# Patient Record
Sex: MALE | Race: BLACK OR AFRICAN AMERICAN | NOT HISPANIC OR LATINO | Employment: OTHER | URBAN - METROPOLITAN AREA
[De-identification: names, ages, dates, MRNs, and addresses within clinical notes are randomized per-mention and may not be internally consistent; named-entity substitution may affect disease eponyms.]

---

## 2017-12-19 ENCOUNTER — HOSPITAL ENCOUNTER (EMERGENCY)
Facility: HOSPITAL | Age: 77
Discharge: HOME | End: 2017-12-19
Attending: EMERGENCY MEDICINE
Payer: MEDICARE

## 2017-12-19 VITALS
HEART RATE: 89 BPM | HEIGHT: 73 IN | WEIGHT: 196.63 LBS | BODY MASS INDEX: 26.06 KG/M2 | DIASTOLIC BLOOD PRESSURE: 65 MMHG | RESPIRATION RATE: 18 BRPM | OXYGEN SATURATION: 96 % | TEMPERATURE: 100 F | SYSTOLIC BLOOD PRESSURE: 120 MMHG

## 2017-12-19 DIAGNOSIS — Z20.828 EXPOSURE TO INFLUENZA: Primary | ICD-10-CM

## 2017-12-19 DIAGNOSIS — J11.1 INFLUENZA-LIKE ILLNESS: ICD-10-CM

## 2017-12-19 PROCEDURE — 99283 EMERGENCY DEPT VISIT LOW MDM: CPT

## 2017-12-19 RX ORDER — METFORMIN HYDROCHLORIDE 500 MG/1
500 TABLET ORAL 2 TIMES DAILY WITH MEALS
COMMUNITY

## 2017-12-19 RX ORDER — ATORVASTATIN CALCIUM 40 MG/1
40 TABLET, FILM COATED ORAL DAILY
COMMUNITY

## 2017-12-19 RX ORDER — OSELTAMIVIR PHOSPHATE 75 MG/1
75 CAPSULE ORAL 2 TIMES DAILY
Qty: 10 CAPSULE | Refills: 0 | Status: SHIPPED | OUTPATIENT
Start: 2017-12-19 | End: 2017-12-24

## 2017-12-19 RX ORDER — GUAIFENESIN/DEXTROMETHORPHAN 100-10MG/5
5 SYRUP ORAL 4 TIMES DAILY PRN
Qty: 120 ML | Refills: 0 | Status: ON HOLD | OUTPATIENT
Start: 2017-12-19 | End: 2018-01-02 | Stop reason: HOSPADM

## 2017-12-19 RX ORDER — LISINOPRIL 10 MG/1
10 TABLET ORAL DAILY
COMMUNITY

## 2017-12-20 NOTE — ED PROVIDER NOTES
Encounter Date: 12/19/2017       History     Chief Complaint   Patient presents with    URI     Patient brought in by daughter who was seen here today with her children and another relative. He reports a cough for 1 week. He denies any mucus. Patient had the flu shot last week     The history is provided by the patient.   URI   The primary symptoms include fever, sore throat, cough and myalgias. The current episode started today. This is a new problem. The fever began just prior to arrival. The fever has been unchanged since its onset. The temperature was taken by no thermometer. The maximum temperature recorded prior to his arrival was unknown.   The sore throat began today. The sore throat has been unchanged since its onset. The sore throat pain is at a severity of 3/10.   The cough began today. The cough is non-productive. There is nondescript sputum produced.   Myalgias began today. The myalgias have been unchanged since their onset. The myalgias are generalized.   Pt with Influenza contact at home.    Review of patient's allergies indicates:  No Known Allergies  Past Medical History:   Diagnosis Date    Diabetes mellitus     Hyperlipemia     Prostate atrophy      History reviewed. No pertinent surgical history.  History reviewed. No pertinent family history.  Social History   Substance Use Topics    Smoking status: Former Smoker     Quit date: 1985    Smokeless tobacco: Never Used    Alcohol use No     Review of Systems   Constitutional: Positive for fever.   HENT: Positive for sore throat.    Respiratory: Positive for cough.    Musculoskeletal: Positive for myalgias.   All other systems reviewed and are negative.      Physical Exam     Initial Vitals [12/19/17 2113]   BP Pulse Resp Temp SpO2   120/65 89 18 99.8 °F (37.7 °C) 96 %      MAP       83.33         Physical Exam    Nursing note and vitals reviewed.  Constitutional: He appears well-developed and well-nourished. No distress.   HENT:   Head:  Normocephalic and atraumatic.   Mouth/Throat: Oropharynx is clear and moist.   Eyes: Pupils are equal, round, and reactive to light.   Neck: Normal range of motion. Neck supple.   Cardiovascular: Normal rate, regular rhythm and normal heart sounds.   Pulmonary/Chest: Breath sounds normal. No respiratory distress.   Abdominal: Soft. Bowel sounds are normal. There is no tenderness.   Musculoskeletal: Normal range of motion.   Neurological: He is alert and oriented to person, place, and time. He has normal strength.   Skin: Skin is warm and dry.   Psychiatric: He has a normal mood and affect. Thought content normal.         ED Course   Procedures  Labs Reviewed - No data to display     9:40 PM - Counseling: Spoke with the patient and discussed todays findings, in addition to providing specific details for the plan of care and counseling regarding the diagnosis and prognosis. Questions are answered at this time.                         ED Course      Clinical Impression:   The primary encounter diagnosis was Exposure to influenza. A diagnosis of Influenza-like illness was also pertinent to this visit.    Disposition:   Disposition: Discharged  Condition: Stable                        Eddie Aparicio MD  12/19/17 5115

## 2017-12-20 NOTE — ED NOTES
Pt's prescriptions called in to [facility] on [address]--[place], [place] per pt's request.

## 2017-12-29 ENCOUNTER — HOSPITAL ENCOUNTER (INPATIENT)
Facility: HOSPITAL | Age: 77
LOS: 3 days | Discharge: HOME HEALTH | DRG: 189 | End: 2018-01-02
Attending: EMERGENCY MEDICINE | Admitting: INTERNAL MEDICINE
Payer: MEDICARE

## 2017-12-29 DIAGNOSIS — I26.99 OTHER ACUTE PULMONARY EMBOLISM WITHOUT ACUTE COR PULMONALE: ICD-10-CM

## 2017-12-29 DIAGNOSIS — I26.99 PULMONARY EMBOLISM: ICD-10-CM

## 2017-12-29 DIAGNOSIS — D64.9 ANEMIA, UNSPECIFIED TYPE: ICD-10-CM

## 2017-12-29 DIAGNOSIS — J18.9 PNEUMONIA DUE TO INFECTIOUS ORGANISM, UNSPECIFIED LATERALITY, UNSPECIFIED PART OF LUNG: Primary | ICD-10-CM

## 2017-12-29 DIAGNOSIS — E11.9 TYPE 2 DIABETES MELLITUS WITHOUT COMPLICATION, WITH LONG-TERM CURRENT USE OF INSULIN: ICD-10-CM

## 2017-12-29 DIAGNOSIS — Z79.4 TYPE 2 DIABETES MELLITUS WITHOUT COMPLICATION, WITH LONG-TERM CURRENT USE OF INSULIN: ICD-10-CM

## 2017-12-29 DIAGNOSIS — J18.9 PNEUMONIA OF RIGHT LOWER LOBE DUE TO INFECTIOUS ORGANISM: ICD-10-CM

## 2017-12-29 DIAGNOSIS — R09.02 HYPOXIA: ICD-10-CM

## 2017-12-29 LAB
ALBUMIN SERPL BCP-MCNC: 2.3 G/DL
ALLENS TEST: ABNORMAL
ALLENS TEST: ABNORMAL
ALP SERPL-CCNC: 93 U/L
ALT SERPL W/O P-5'-P-CCNC: 37 U/L
ANION GAP SERPL CALC-SCNC: 9 MMOL/L
ANISOCYTOSIS BLD QL SMEAR: SLIGHT
APTT BLDCRRT: 23.4 SEC
APTT BLDCRRT: 39 SEC
AST SERPL-CCNC: 27 U/L
BASOPHILS # BLD AUTO: 0.02 K/UL
BASOPHILS NFR BLD: 0.1 %
BILIRUB SERPL-MCNC: 0.9 MG/DL
BILIRUB UR QL STRIP: NEGATIVE
BNP SERPL-MCNC: <10 PG/ML
BUN SERPL-MCNC: 14 MG/DL
CALCIUM SERPL-MCNC: 8.7 MG/DL
CHLORIDE SERPL-SCNC: 96 MMOL/L
CLARITY UR REFRACT.AUTO: CLEAR
CO2 SERPL-SCNC: 28 MMOL/L
COLOR UR AUTO: YELLOW
CORTIS SERPL-MCNC: 25.8 UG/DL
CREAT SERPL-MCNC: 1 MG/DL
DELSYS: ABNORMAL
DELSYS: ABNORMAL
DIFFERENTIAL METHOD: ABNORMAL
EOSINOPHIL # BLD AUTO: 0 K/UL
EOSINOPHIL NFR BLD: 0.1 %
ERYTHROCYTE [DISTWIDTH] IN BLOOD BY AUTOMATED COUNT: 12.2 %
EST. GFR  (AFRICAN AMERICAN): >60 ML/MIN/1.73 M^2
EST. GFR  (NON AFRICAN AMERICAN): >60 ML/MIN/1.73 M^2
FIO2: 21
FIO2: 21
FLUAV AG SPEC QL IA: NEGATIVE
FLUBV AG SPEC QL IA: NEGATIVE
GLUCOSE SERPL-MCNC: 163 MG/DL
GLUCOSE UR QL STRIP: ABNORMAL
HCO3 UR-SCNC: 25.3 MMOL/L (ref 24–28)
HCO3 UR-SCNC: 25.9 MMOL/L (ref 24–28)
HCT VFR BLD AUTO: 26.5 %
HCT VFR BLD AUTO: 28.5 %
HGB BLD-MCNC: 8.2 G/DL
HGB BLD-MCNC: 9.1 G/DL
HGB UR QL STRIP: NEGATIVE
INR PPP: 1.1
IRON SERPL-MCNC: 10 UG/DL
KETONES UR QL STRIP: NEGATIVE
LACTATE SERPL-SCNC: 1.3 MMOL/L
LACTATE SERPL-SCNC: 1.5 MMOL/L
LEUKOCYTE ESTERASE UR QL STRIP: NEGATIVE
LIPASE SERPL-CCNC: 31 U/L
LYMPHOCYTES # BLD AUTO: 1.2 K/UL
LYMPHOCYTES NFR BLD: 6.3 %
MAGNESIUM SERPL-MCNC: 1.7 MG/DL
MCH RBC QN AUTO: 27.3 PG
MCHC RBC AUTO-ENTMCNC: 31.9 G/DL
MCV RBC AUTO: 86 FL
MODE: ABNORMAL
MODE: ABNORMAL
MONOCYTES # BLD AUTO: 1.8 K/UL
MONOCYTES NFR BLD: 9.4 %
NEUTROPHILS # BLD AUTO: 16 K/UL
NEUTROPHILS NFR BLD: 82.3 %
NITRITE UR QL STRIP: NEGATIVE
OVALOCYTES BLD QL SMEAR: ABNORMAL
PCO2 BLDA: 34.3 MMHG (ref 35–45)
PCO2 BLDA: 35.7 MMHG (ref 35–45)
PH SMN: 7.46 [PH] (ref 7.35–7.45)
PH SMN: 7.49 [PH] (ref 7.35–7.45)
PH UR STRIP: 5 [PH] (ref 5–8)
PHOSPHATE SERPL-MCNC: 3.5 MG/DL
PLATELET # BLD AUTO: 525 K/UL
PLATELET BLD QL SMEAR: ABNORMAL
PMV BLD AUTO: 9.5 FL
PO2 BLDA: 46 MMHG (ref 80–100)
PO2 BLDA: 46 MMHG (ref 80–100)
POC BE: 1 MMOL/L
POC BE: 2 MMOL/L
POC SATURATED O2: 84 % (ref 95–100)
POC SATURATED O2: 85 % (ref 95–100)
POIKILOCYTOSIS BLD QL SMEAR: SLIGHT
POLYCHROMASIA BLD QL SMEAR: ABNORMAL
POTASSIUM SERPL-SCNC: 4.1 MMOL/L
PROCALCITONIN SERPL IA-MCNC: 0.79 NG/ML
PROT SERPL-MCNC: 7 G/DL
PROT UR QL STRIP: NEGATIVE
PROTHROMBIN TIME: 11.5 SEC
RBC # BLD AUTO: 3.33 M/UL
RETICS/RBC NFR AUTO: 2.8 %
SAMPLE: ABNORMAL
SAMPLE: ABNORMAL
SATURATED IRON: 5 %
SITE: ABNORMAL
SITE: ABNORMAL
SODIUM SERPL-SCNC: 133 MMOL/L
SP GR UR STRIP: 1.01 (ref 1–1.03)
SPECIMEN SOURCE: NORMAL
TOTAL IRON BINDING CAPACITY: 188 UG/DL
TRANSFERRIN SERPL-MCNC: 127 MG/DL
TROPONIN I SERPL DL<=0.01 NG/ML-MCNC: <0.006 NG/ML
TSH SERPL DL<=0.005 MIU/L-ACNC: 1.33 UIU/ML
URN SPEC COLLECT METH UR: ABNORMAL
UROBILINOGEN UR STRIP-ACNC: ABNORMAL EU/DL
WBC # BLD AUTO: 19.39 K/UL

## 2017-12-29 PROCEDURE — 96366 THER/PROPH/DIAG IV INF ADDON: CPT | Mod: XS

## 2017-12-29 PROCEDURE — 94640 AIRWAY INHALATION TREATMENT: CPT

## 2017-12-29 PROCEDURE — 84145 PROCALCITONIN (PCT): CPT

## 2017-12-29 PROCEDURE — 85730 THROMBOPLASTIN TIME PARTIAL: CPT | Mod: 91

## 2017-12-29 PROCEDURE — 83735 ASSAY OF MAGNESIUM: CPT

## 2017-12-29 PROCEDURE — 82533 TOTAL CORTISOL: CPT

## 2017-12-29 PROCEDURE — 83690 ASSAY OF LIPASE: CPT

## 2017-12-29 PROCEDURE — 82746 ASSAY OF FOLIC ACID SERUM: CPT

## 2017-12-29 PROCEDURE — 96365 THER/PROPH/DIAG IV INF INIT: CPT | Mod: XS

## 2017-12-29 PROCEDURE — 99900035 HC TECH TIME PER 15 MIN (STAT)

## 2017-12-29 PROCEDURE — 87086 URINE CULTURE/COLONY COUNT: CPT

## 2017-12-29 PROCEDURE — 85045 AUTOMATED RETICULOCYTE COUNT: CPT

## 2017-12-29 PROCEDURE — 85018 HEMOGLOBIN: CPT

## 2017-12-29 PROCEDURE — 96375 TX/PRO/DX INJ NEW DRUG ADDON: CPT | Mod: XS

## 2017-12-29 PROCEDURE — 82803 BLOOD GASES ANY COMBINATION: CPT

## 2017-12-29 PROCEDURE — 96361 HYDRATE IV INFUSION ADD-ON: CPT

## 2017-12-29 PROCEDURE — 25000003 PHARM REV CODE 250: Performed by: EMERGENCY MEDICINE

## 2017-12-29 PROCEDURE — 81003 URINALYSIS AUTO W/O SCOPE: CPT

## 2017-12-29 PROCEDURE — 87400 INFLUENZA A/B EACH AG IA: CPT

## 2017-12-29 PROCEDURE — 84100 ASSAY OF PHOSPHORUS: CPT

## 2017-12-29 PROCEDURE — 87040 BLOOD CULTURE FOR BACTERIA: CPT | Mod: 59

## 2017-12-29 PROCEDURE — 21400001 HC TELEMETRY ROOM

## 2017-12-29 PROCEDURE — 36600 WITHDRAWAL OF ARTERIAL BLOOD: CPT

## 2017-12-29 PROCEDURE — 85014 HEMATOCRIT: CPT | Mod: 91

## 2017-12-29 PROCEDURE — 27000221 HC OXYGEN, UP TO 24 HOURS

## 2017-12-29 PROCEDURE — 93005 ELECTROCARDIOGRAM TRACING: CPT

## 2017-12-29 PROCEDURE — 83540 ASSAY OF IRON: CPT

## 2017-12-29 PROCEDURE — 96367 TX/PROPH/DG ADDL SEQ IV INF: CPT

## 2017-12-29 PROCEDURE — 63600175 PHARM REV CODE 636 W HCPCS: Performed by: EMERGENCY MEDICINE

## 2017-12-29 PROCEDURE — 84443 ASSAY THYROID STIM HORMONE: CPT

## 2017-12-29 PROCEDURE — 25000242 PHARM REV CODE 250 ALT 637 W/ HCPCS: Performed by: EMERGENCY MEDICINE

## 2017-12-29 PROCEDURE — 96376 TX/PRO/DX INJ SAME DRUG ADON: CPT | Mod: XS

## 2017-12-29 PROCEDURE — 80053 COMPREHEN METABOLIC PANEL: CPT

## 2017-12-29 PROCEDURE — 96368 THER/DIAG CONCURRENT INF: CPT | Mod: XS

## 2017-12-29 PROCEDURE — 85025 COMPLETE CBC W/AUTO DIFF WBC: CPT

## 2017-12-29 PROCEDURE — 93010 ELECTROCARDIOGRAM REPORT: CPT | Mod: ,,, | Performed by: INTERNAL MEDICINE

## 2017-12-29 PROCEDURE — 85730 THROMBOPLASTIN TIME PARTIAL: CPT

## 2017-12-29 PROCEDURE — 25500020 PHARM REV CODE 255: Performed by: EMERGENCY MEDICINE

## 2017-12-29 PROCEDURE — 82607 VITAMIN B-12: CPT

## 2017-12-29 PROCEDURE — 83605 ASSAY OF LACTIC ACID: CPT | Mod: 91

## 2017-12-29 PROCEDURE — 84484 ASSAY OF TROPONIN QUANT: CPT

## 2017-12-29 PROCEDURE — 85610 PROTHROMBIN TIME: CPT

## 2017-12-29 PROCEDURE — 83880 ASSAY OF NATRIURETIC PEPTIDE: CPT

## 2017-12-29 PROCEDURE — 99291 CRITICAL CARE FIRST HOUR: CPT | Mod: 25

## 2017-12-29 RX ORDER — IPRATROPIUM BROMIDE AND ALBUTEROL SULFATE 2.5; .5 MG/3ML; MG/3ML
3 SOLUTION RESPIRATORY (INHALATION)
Status: DISPENSED | OUTPATIENT
Start: 2017-12-29 | End: 2017-12-29

## 2017-12-29 RX ORDER — LATANOPROST 50 UG/ML
1 SOLUTION/ DROPS OPHTHALMIC NIGHTLY
COMMUNITY

## 2017-12-29 RX ORDER — METHYLPREDNISOLONE SOD SUCC 125 MG
80 VIAL (EA) INJECTION EVERY 8 HOURS
Status: DISCONTINUED | OUTPATIENT
Start: 2017-12-29 | End: 2018-01-02 | Stop reason: HOSPADM

## 2017-12-29 RX ORDER — DORZOLAMIDE HYDROCHLORIDE AND TIMOLOL MALEATE 20; 5 MG/ML; MG/ML
1 SOLUTION/ DROPS OPHTHALMIC 2 TIMES DAILY
COMMUNITY

## 2017-12-29 RX ORDER — HEPARIN SODIUM,PORCINE/D5W 25000/250
17 INTRAVENOUS SOLUTION INTRAVENOUS CONTINUOUS
Status: DISCONTINUED | OUTPATIENT
Start: 2017-12-29 | End: 2017-12-30

## 2017-12-29 RX ORDER — ALBUTEROL SULFATE 90 UG/1
2 AEROSOL, METERED RESPIRATORY (INHALATION) EVERY 6 HOURS PRN
COMMUNITY

## 2017-12-29 RX ORDER — SODIUM CHLORIDE 9 MG/ML
INJECTION, SOLUTION INTRAVENOUS CONTINUOUS
Status: DISCONTINUED | OUTPATIENT
Start: 2017-12-29 | End: 2018-01-02 | Stop reason: HOSPADM

## 2017-12-29 RX ORDER — CIPROFLOXACIN 2 MG/ML
400 INJECTION, SOLUTION INTRAVENOUS
Status: DISCONTINUED | OUTPATIENT
Start: 2017-12-29 | End: 2018-01-02 | Stop reason: HOSPADM

## 2017-12-29 RX ORDER — IPRATROPIUM BROMIDE AND ALBUTEROL SULFATE 2.5; .5 MG/3ML; MG/3ML
3 SOLUTION RESPIRATORY (INHALATION) EVERY 6 HOURS
Status: DISCONTINUED | OUTPATIENT
Start: 2017-12-29 | End: 2018-01-02 | Stop reason: HOSPADM

## 2017-12-29 RX ORDER — INSULIN GLARGINE 100 [IU]/ML
25 INJECTION, SOLUTION SUBCUTANEOUS DAILY
COMMUNITY

## 2017-12-29 RX ADMIN — VANCOMYCIN HYDROCHLORIDE 1 G: 1 INJECTION, POWDER, LYOPHILIZED, FOR SOLUTION INTRAVENOUS at 03:12

## 2017-12-29 RX ADMIN — IPRATROPIUM BROMIDE AND ALBUTEROL SULFATE 3 ML: .5; 3 SOLUTION RESPIRATORY (INHALATION) at 01:12

## 2017-12-29 RX ADMIN — CIPROFLOXACIN 400 MG: 2 INJECTION, SOLUTION INTRAVENOUS at 02:12

## 2017-12-29 RX ADMIN — IPRATROPIUM BROMIDE AND ALBUTEROL SULFATE 3 ML: .5; 3 SOLUTION RESPIRATORY (INHALATION) at 07:12

## 2017-12-29 RX ADMIN — PIPERACILLIN AND TAZOBACTAM 4.5 G: 4; .5 INJECTION, POWDER, FOR SOLUTION INTRAVENOUS at 11:12

## 2017-12-29 RX ADMIN — IOHEXOL 100 ML: 350 INJECTION, SOLUTION INTRAVENOUS at 02:12

## 2017-12-29 RX ADMIN — HEPARIN SODIUM AND DEXTROSE 17 UNITS/KG/HR: 10000; 5 INJECTION INTRAVENOUS at 03:12

## 2017-12-29 RX ADMIN — PIPERACILLIN AND TAZOBACTAM 4.5 G: 4; .5 INJECTION, POWDER, FOR SOLUTION INTRAVENOUS at 01:12

## 2017-12-29 RX ADMIN — SODIUM CHLORIDE 2544 ML: 0.9 INJECTION, SOLUTION INTRAVENOUS at 01:12

## 2017-12-29 RX ADMIN — SODIUM CHLORIDE: 0.9 INJECTION, SOLUTION INTRAVENOUS at 04:12

## 2017-12-29 RX ADMIN — VANCOMYCIN HYDROCHLORIDE 500 MG: 500 INJECTION, POWDER, LYOPHILIZED, FOR SOLUTION INTRAVENOUS at 04:12

## 2017-12-29 RX ADMIN — METHYLPREDNISOLONE SODIUM SUCCINATE 80 MG: 125 INJECTION, POWDER, FOR SOLUTION INTRAMUSCULAR; INTRAVENOUS at 11:12

## 2017-12-29 NOTE — PROGRESS NOTES
ABG done at 1312. PO2 46. MD asked RT to repeat sample. ABG obtained from opposite arm. PO2 was the same, 46. MD informed. Pt placed on NC 4lpm and neb txs given.

## 2017-12-29 NOTE — ED PROVIDER NOTES
Encounter Date: 12/29/2017       History     Chief Complaint   Patient presents with    Shortness of Breath     HH nurse sent patient to the ED for low O2 sat; pt has history of flu; [facility]     CHIEF COMPLIANT: Shortness of Breath (HH nurse sent patient to the ED for low O2 sat; pt has history of flu; [facility] ([facility]      12/29/2017, 12:52 PM     The history is provided by the patient and edmonder. Uday Reyna is a 77 y.o. male presenting to the ED for low oxygen noted by home health care.  Patient reportedly was exposed to the flu on 19 December.  He presented to [facility] on December 20 and was admitted for influenza type B.  He completed his course of Tamiflu.  Patient was admitted from 20 December until 26 December.  He was discharged home with continued steroid taper 40 mg for 2 days then 20 mg for 2 days.  Daughter notes that he's had a nonproductive cough.  He was not discharged home on antibiotics.  He's been treated with spirometry and Mucinex.  Patient reports that he feels short of breath.  Daughter notes that he is breathing heavy when he exerts himself.  Appears to be worse with exertion, better with rest.  There is no associated paroxysmal nocturnal dyspnea or orthopnea.  Patient and family report intermittent fever while he was hospitalized.  Patient denies any prior history of pulmonary embolism, DVT, calf pain, calf tenderness, or trauma to lower extremities.  Of note, patient was hospitalized for 6 days.  Patient denies any chest pain, chest pressure, hemoptysis, melena, hematochezia, hematemesis, dysuria, urgency, frequency.    PCP: Wallace Kennedy MD  Specialist:             Review of patient's allergies indicates:  No Known Allergies  Past Medical History:   Diagnosis Date    COPD (chronic obstructive pulmonary disease)     Diabetes mellitus     Hyperlipemia     Prostate atrophy      Past Surgical History:  "  Procedure Laterality Date    CARPAL TUNNEL RELEASE      WRIST FRACTURE SURGERY       History reviewed. No pertinent family history.  Social History   Substance Use Topics    Smoking status: Former Smoker     Quit date: 1985    Smokeless tobacco: Never Used    Alcohol use No     Review of Systems   Constitutional: Positive for chills, fatigue and fever.   HENT: Negative for sore throat.    Respiratory: Positive for cough and shortness of breath.    Cardiovascular: Negative for chest pain and leg swelling.   Gastrointestinal: Negative for anal bleeding, blood in stool, constipation, nausea and vomiting.   Genitourinary: Negative for dysuria.   Musculoskeletal: Negative for back pain.   Skin: Negative for rash.   Neurological: Negative for weakness and light-headedness.   Hematological: Does not bruise/bleed easily.   Psychiatric/Behavioral: Negative for confusion.       Physical Exam     Initial Vitals [12/29/17 1242]   BP Pulse Resp Temp SpO2   (!) 115/55 94 (!) 22 99.5 °F (37.5 °C) (!) 89 %      MAP       75         Vitals:    12/29/17 1242 12/29/17 1314 12/29/17 1315 12/29/17 1330   BP: (!) 115/55  (!) 115/57 118/66   Pulse: 94 90 89 89   Resp: (!) 22  (!) 26 (!) 25   Temp: 99.5 °F (37.5 °C)      TempSrc: Oral      SpO2: (!) 89%  95% 95%   Weight: 84.8 kg (187 lb)      Height: 6' 1" (1.854 m)       12/29/17 1334 12/29/17 1339 12/29/17 1345 12/29/17 1430   BP:   132/64 (!) 109/56   Pulse: 86 85 85 94   Resp: (!) 24 (!) 25 (!) 26 (!) 24   Temp:       TempSrc:       SpO2: 100% 100% 100% 99%   Weight:       Height:        12/29/17 1515 12/29/17 1530 12/29/17 1600 12/29/17 1630   BP: (!) 112/57 (!) 106/55 (!) 116/54 (!) 118/57   Pulse: 91 88 94 91   Resp: (!) 23 (!) 21 (!) 23 (!) 22   Temp: 99 °F (37.2 °C)      TempSrc: Oral      SpO2: 98% 99% 100% 100%   Weight:       Height:        12/29/17 1700 12/29/17 1730   BP: 122/60 (!) 117/56   Pulse: 91 96   Resp: (!) 24 (!) 29   Temp:     TempSrc:     SpO2: 99% 98% "   Weight:     Height:         Physical Exam    Nursing note and vitals reviewed.  Constitutional: He appears well-developed and well-nourished.   HENT:   Head: Normocephalic.   Right Ear: External ear normal.   Left Ear: External ear normal.   Nose: Mucosal edema and rhinorrhea present. No epistaxis.   Mouth/Throat: Oropharynx is clear and moist.   Eyes: Pupils are equal, round, and reactive to light.   Neck: Normal range of motion.   Cardiovascular: Normal rate, regular rhythm, normal heart sounds and intact distal pulses.   Pulmonary/Chest: He has no wheezes. He has rhonchi. He has no rales.   Abdominal: Soft. Bowel sounds are normal. There is no tenderness. There is no rebound and no guarding.   Musculoskeletal: Normal range of motion. He exhibits no edema or tenderness.   Neurological: He is alert and oriented to person, place, and time. He has normal strength. No cranial nerve deficit.   Skin: Skin is warm.   Psychiatric: He has a normal mood and affect. Judgment and thought content normal.         ED Course   Critical Care  Date/Time: 12/29/2017 12:52 PM  Performed by: KWAKU HALEY  Authorized by: KWAKU HALEY   Direct patient critical care time: 10 minutes  Additional history critical care time: 10 minutes  Ordering / reviewing critical care time: 8 minutes  Documentation critical care time: 10 minutes  Consulting other physicians critical care time: 7 minutes  Other critical care time: 10 minutes  Total critical care time (exclusive of procedural time) : 55 minutes  Critical care time was exclusive of separately billable procedures and treating other patients.  Critical care was necessary to treat or prevent imminent or life-threatening deterioration of the following conditions: sepsis and circulatory failure.  Critical care was time spent personally by me on the following activities: blood draw for specimens, development of treatment plan with patient or surrogate, discussions with consultants,  evaluation of patient's response to treatment, examination of patient, obtaining history from patient or surrogate, ordering and performing treatments and interventions, ordering and review of laboratory studies, ordering and review of radiographic studies, pulse oximetry, re-evaluation of patient's condition and review of old charts.  Subsequent provider of critical care: I assumed direction of critical care for this patient from another provider of my specialty.        Labs Reviewed   CBC W/ AUTO DIFFERENTIAL - Abnormal; Notable for the following:        Result Value    WBC 19.39 (*)     RBC 3.33 (*)     Hemoglobin 9.1 (*)     Hematocrit 28.5 (*)     MCHC 31.9 (*)     Platelets 525 (*)     Gran # 16.0 (*)     Mono # 1.8 (*)     Gran% 82.3 (*)     Lymph% 6.3 (*)     Platelet Estimate Increased (*)     All other components within normal limits   COMPREHENSIVE METABOLIC PANEL - Abnormal; Notable for the following:     Sodium 133 (*)     Glucose 163 (*)     Albumin 2.3 (*)     All other components within normal limits   URINALYSIS - Abnormal; Notable for the following:     Glucose, UA Trace (*)     Urobilinogen, UA 2.0-3.0 (*)     All other components within normal limits   RETICULOCYTES - Abnormal; Notable for the following:     Retic 2.8 (*)     All other components within normal limits   ISTAT PROCEDURE - Abnormal; Notable for the following:     POC PH 7.459 (*)     POC PO2 46 (*)     POC SATURATED O2 84 (*)     All other components within normal limits   ISTAT PROCEDURE - Abnormal; Notable for the following:     POC PH 7.486 (*)     POC PCO2 34.3 (*)     POC PO2 46 (*)     POC SATURATED O2 85 (*)     All other components within normal limits   CULTURE, BLOOD   CULTURE, BLOOD   CULTURE, URINE   APTT   B-TYPE NATRIURETIC PEPTIDE   LACTIC ACID, PLASMA   LIPASE   MAGNESIUM   PHOSPHORUS   PROTIME-INR   TROPONIN I   TSH   INFLUENZA A AND B ANTIGEN   VITAMIN B12   IRON AND TIBC   FOLATE   RETICULOCYTES   CORTISOL, RANDOM    PROCALCITONIN   APTT   PROTIME-INR   PLATELET COUNT   IRON AND TIBC   LACTIC ACID, PLASMA   LACTIC ACID, PLASMA   HEMOGLOBIN   HEMATOCRIT   VITAMIN B12   FOLATE   TYPE & SCREEN     EKG Readings: (Independently Interpreted)   Rate 90 bpm.  Sinus rhythm.  Normal axis.  No acute ST or T-wave changes noted.     Results for orders placed or performed during the hospital encounter of 12/29/17   APTT   Result Value Ref Range    aPTT 23.4 21.0 - 32.0 sec   Brain natriuretic peptide   Result Value Ref Range    BNP <10 0 - 99 pg/mL   CBC auto differential   Result Value Ref Range    WBC 19.39 (H) 3.90 - 12.70 K/uL    RBC 3.33 (L) 4.60 - 6.20 M/uL    Hemoglobin 9.1 (L) 14.0 - 18.0 g/dL    Hematocrit 28.5 (L) 40.0 - 54.0 %    MCV 86 82 - 98 fL    MCH 27.3 27.0 - 31.0 pg    MCHC 31.9 (L) 32.0 - 36.0 g/dL    RDW 12.2 11.5 - 14.5 %    Platelets 525 (H) 150 - 350 K/uL    MPV 9.5 9.2 - 12.9 fL    Gran # 16.0 (H) 1.8 - 7.7 K/uL    Lymph # 1.2 1.0 - 4.8 K/uL    Mono # 1.8 (H) 0.3 - 1.0 K/uL    Eos # 0.0 0.0 - 0.5 K/uL    Baso # 0.02 0.00 - 0.20 K/uL    Gran% 82.3 (H) 38.0 - 73.0 %    Lymph% 6.3 (L) 18.0 - 48.0 %    Mono% 9.4 4.0 - 15.0 %    Eosinophil% 0.1 0.0 - 8.0 %    Basophil% 0.1 0.0 - 1.9 %    Platelet Estimate Increased (A)     Aniso Slight     Poik Slight     Poly Occasional     Ovalocytes CANCELED     Differential Method Automated    Comprehensive metabolic panel   Result Value Ref Range    Sodium 133 (L) 136 - 145 mmol/L    Potassium 4.1 3.5 - 5.1 mmol/L    Chloride 96 95 - 110 mmol/L    CO2 28 23 - 29 mmol/L    Glucose 163 (H) 70 - 110 mg/dL    BUN, Bld 14 8 - 23 mg/dL    Creatinine 1.0 0.5 - 1.4 mg/dL    Calcium 8.7 8.7 - 10.5 mg/dL    Total Protein 7.0 6.0 - 8.4 g/dL    Albumin 2.3 (L) 3.5 - 5.2 g/dL    Total Bilirubin 0.9 0.1 - 1.0 mg/dL    Alkaline Phosphatase 93 55 - 135 U/L    AST 27 10 - 40 U/L    ALT 37 10 - 44 U/L    Anion Gap 9 8 - 16 mmol/L    eGFR if African American >60.0 >60 mL/min/1.73 m^2    eGFR if non  African American >60.0 >60 mL/min/1.73 m^2   Lactic acid, plasma #1   Result Value Ref Range    Lactate (Lactic Acid) 1.5 0.5 - 2.2 mmol/L   Lipase   Result Value Ref Range    Lipase 31 4 - 60 U/L   Magnesium   Result Value Ref Range    Magnesium 1.7 1.6 - 2.6 mg/dL   Phosphorus   Result Value Ref Range    Phosphorus 3.5 2.7 - 4.5 mg/dL   Protime-INR   Result Value Ref Range    Prothrombin Time 11.5 9.0 - 12.5 sec    INR 1.1 0.8 - 1.2   Troponin I   Result Value Ref Range    Troponin I <0.006 0.000 - 0.026 ng/mL   TSH   Result Value Ref Range    TSH 1.331 0.400 - 4.000 uIU/mL   Urinalysis   Result Value Ref Range    Specimen UA Urine, Clean Catch     Color, UA Yellow Yellow, Straw, Adriana    Appearance, UA Clear Clear    pH, UA 5.0 5.0 - 8.0    Specific Gravity, UA 1.015 1.005 - 1.030    Protein, UA Negative Negative    Glucose, UA Trace (A) Negative    Ketones, UA Negative Negative    Bilirubin (UA) Negative Negative    Occult Blood UA Negative Negative    Nitrite, UA Negative Negative    Urobilinogen, UA 2.0-3.0 (A) <2.0 EU/dL    Leukocytes, UA Negative Negative   Influenza antigen Nasal Swab   Result Value Ref Range    Influenza A Ag, EIA Negative Negative    Influenza B Ag, EIA Negative Negative    Flu A & B Source Nasal Swab    Reticulocytes   Result Value Ref Range    Retic 2.8 (H) 0.4 - 2.0 %   ISTAT PROCEDURE   Result Value Ref Range    POC PH 7.459 (H) 7.35 - 7.45    POC PCO2 35.7 35 - 45 mmHg    POC PO2 46 (LL) 80 - 100 mmHg    POC HCO3 25.3 24 - 28 mmol/L    POC BE 1 -2 to 2 mmol/L    POC SATURATED O2 84 (L) 95 - 100 %    Sample ARTERIAL     Site LR     Allens Test Pass     DelSys Room Air     Mode SPONT     FiO2 21    ISTAT PROCEDURE   Result Value Ref Range    POC PH 7.486 (H) 7.35 - 7.45    POC PCO2 34.3 (L) 35 - 45 mmHg    POC PO2 46 (LL) 80 - 100 mmHg    POC HCO3 25.9 24 - 28 mmol/L    POC BE 2 -2 to 2 mmol/L    POC SATURATED O2 85 (L) 95 - 100 %    Sample ARTERIAL     Site RR     Allens Test Pass      DelSys Room Air     Mode SPONT     FiO2 21        Imaging Results          US Lower Extremity Veins Bilateral (Final result)  Result time 12/29/17 17:17:20    Final result by Jt Warren MD (12/29/17 17:17:20)                 Impression:     There is no evidence of deep venous thrombosis bilateral lower extremities.        Electronically signed by: JT WARREN  Date:     12/29/17  Time:    17:17              Narrative:    Exam: US LOWER EXTREMITY VEINS BILATERAL     Indication:  I26.99 Other pulmonary embolism without acute cor pulmonale;    Findings: There is a documented compressibility and color Doppler flow with normal venous waveform in good calf augmentation response bilateral lower extremities.                             CTA Chest Non-Coronary (PE Study) (Final result)     Abnormal  Result time 12/29/17 14:35:39    Final result by Joey Schafer MD (12/29/17 14:35:39)                 Impression:         Patchy nodular interstitial and alveolar infiltrates are seen within the lower lobes and to a lesser degree within the right upper lobe centrally which are thought to reflect multifocal airspace disease or aspiration. Followup to resolution recommended.    Small pulmonary embolism suspected within a subsegmental branch of the right upper lobe on image 62, sequence 2. Small clots within the lower lobes bilaterally are not excluded however evaluation of segmental branches is limited due to motion and bolus tracking. The RV to LV ratio measures 1.1 which is equivocal for right heart strain. Consider cardiac echo as clinically warranted.    Moderate adenopathy within the mediastinum and bilateral hilar regions, right greater than left, which are likely reactive.     Findings discussed with KWAKU HALEY at 14:35:12    All CT scans at this facility use dose modulation, iterative reconstruction, and/or weight base dosing when appropriate to reduce radiation dose to as low as reasonably  achievable.      Electronically signed by: FREYA VENEGAS MD  Date:     12/29/17  Time:    14:35              Narrative:    Clinical data: Hypoxia.    Axial CTA images through the chest after administration of contrast was performed according to PE CT angiogram study protocol after administration of 100 cc of Omni 350 contrast.    Findings:    No pulmonary embolism is seen within the main pulmonary artery or right/left pulmonary arteries however clot suspected within a subsegmental branch of the right upper lobe on image 62, sequence 2. Evaluation of segmental branches is limited due to motion and bolus tracking.. The RV to LV ratio measures 1.1 which is equivocal for right heart strain. Consider cardiac echo as clinically warranted.    Structures of the base of the neck are normal.    The heart and pericardium are unremarkable.    Mediastinal structures including great vessels, trachea, esophagus are intact.    Moderate adenopathy within the mediastinum and bilateral hilar regions, right greater than left, which are likely reactive. No evidence of axillary adenopathy..      Patchy nodular interstitial and alveolar infiltrates are seen within the lower lobes and to a lesser degree within the right upper lobe centrally which are thought to reflect multifocal airspace disease or aspiration. Followup.    Surrounding chest wall structures, visualized abdominal organs, bones are unremarkable.                             X-Ray Chest AP Portable (Final result)  Result time 12/29/17 14:03:06    Final result by Angus Dodd MD (12/29/17 14:03:06)                 Impression:     See above            Electronically signed by: ANGUS DODD MD  Date:     12/29/17  Time:    14:03              Narrative:    Exam: Portable chest radiograph    Clinical History:   sepsis.     Comparison: None.    Findings:.     There is some retrocardiac left lower lobe atelectasis and or infiltrate and subtle interstitial opacity in the lung bases  bilaterally. Heart size appears borderline enlarged. No pleural effusion or pneumothorax.                            Results for NEGRO LANDAVERDE (MRN [id number]) as of 12/29/2017 14:33   Ref. Range 12/29/2017 13:12 12/29/2017 13:26   POC PH Latest Ref Range: 7.35 - 7.45  7.459 (H) 7.486 (H)   POC PCO2 Latest Ref Range: 35 - 45 mmHg 35.7 34.3 (L)   POC PO2 Latest Ref Range: 80 - 100 mmHg 46 (LL) 46 (LL)   POC BE Latest Ref Range: -2 to 2 mmol/L 1 2   POC HCO3 Latest Ref Range: 24 - 28 mmol/L 25.3 25.9   POC SATURATED O2 Latest Ref Range: 95 - 100 % 84 (L) 85 (L)   FiO2 Unknown 21 21   Sample Unknown ARTERIAL ARTERIAL   DelSys Unknown Room Air Room Air   Allens Test Unknown Pass Pass   Site Unknown LR RR   Mode Unknown SPONT SPONT        Medical Decision Making:   History:   Old Medical Records: I decided to obtain old medical records.  Old Records Summarized: records from another hospital.       <> Summary of Records: Formatting of this note may be different from the original.  Admit Date 12/20/2017   Discharge Date 12/26/2017   Location [place] [address]   Treatment Team Primary Care Physician: Wallace Kennedy MD  Discharging Physician: Trung Tapia MD  Treatment Team:   Attending Provider: Trung Tapia MD  Hospitalist: [facility] Md Day Team #02 [id number]  Hospitalist: [facility] Md Pm #01 [id number] ([facility] 1-3)  Hospitalist: [facility] Nurse Day Team #02 [id number]  Admitting Provider: Trung Tapia MD       Reason for Hospitalization   Cough, congestion, shortness of breath    Influenza B  77-year-old -American gentleman with HTN, DM 2, lipids presents with worsening painful cough, N/V/D and chest pain. Patient was recently diagnosed with flu at [facility] emergency room and given a prescription for Tamiflu and Robitussin. Despite these agents, his symptoms persisted. He continued to have worsening respiratory symptoms along with N/V/D. Patient was admitted for further workup and evaluation.    Patient initially presented with URI  symptoms and repeat positive testing for influenza B. Patient was continued on Tamiflu therapy to complete course of treatment during this hospitalization. Patient was continued on incentive spirometer, due to persistent wheezing and congestion, was continued on scheduled Pulmicort nebs along with duo nebs 4 times daily. Patient was started on low-dose steroids and plan to taper in the outpatient setting. Patient has been weaned off of supplemental oxygen.    Essential hypertension  Continue to monitor blood pressure closely.    Hyperlipidemia with target LDL less than 100  Continue statin    Diabetes type 2, controlled ([facility])  A1c 8.7%. Patient was on oral agents only prior to admission. Patient is followed by Dr. Ramses Cabral with endocrinology. Patient may warrant consideration of insulin upon discharge. Titrate Lantus to 25 units daily. Steroids likely exacerbating sugars although with an A1c of 8.7, suspect he will need more than just oral agents upon discharge. Long discussion with patient and family, ask nursing to provide diabetic education and place formal diabetic education consult. Patient reports having diabetes for over 30 years. I suspect he will likely need insulin in the outpatient setting for the long-term.    Diarrhea  Now resolved. Patient has had several episodes of watery diarrhea prior to admission. This has improved, having had only one episode since admission. Continue to follow & monitor in the outpatient setting.    Fever  Patient did have 1 temp spike elevation over 101 right after he was admitted. Suspect this is related to his influenza URI as above. No further fever during his hospitalization. Continue current respiratory regimen, low threshold to add antibiotics if fever persists.    Hyponatremia  Patient was found to have mild hyponatremia throughout the hospitalization, with average sodium approximately 134. Possibly related to his exacerbated hyperglycemia. This appears to be  stable without any specific symptoms as a result. Outpatient follow-up recommended.    Final Medication List     ACTIVE     albuterol HFA 90 mcg/actuation inhaler   Commonly known as: PROVENTIL HFA;VENTOLIN HFA   2 puffs, Inhalation, Q4H PRN     alcohol swabs Padm   Commonly known as: ALCOHOL PREP SWABS   Test 4 times daily     aspirin 81 mg EC tablet   81 mg, Oral, Every Other Day     atorvastatin 40 mg tablet   Commonly known as: LIPITOR   TAKE 1 TABLET EVERY DAY     blood glucose control high,low Soln   Commonly known as: ACCU-CHEK JENN CONTROL SOLN   Test 4 times daily     blood sugar diagnostic Strp   Commonly known as: ACCU-CHEK JENN PLUS TEST STRP   TEST FOUR TIMES DAILY     DELTASONE 20 mg Tab   2 tablets PO Daily x 2 days then 1 tablet PO Daily x 2 days then stop     dextromethorphan-guaifenesin  mg Tb12   Commonly known as: MUCINEX-DM   1 tablet, Oral, BID     FREESTYLE LITE METER kit   Generic drug: blood-glucose meter   PATIENT IS USING BOTH ACCU-CHEK     insulin glargine 100 unit/mL (3 mL) injection   Commonly known as: LANTUS   25 Units, Subcutaneous, QAM   Start taking on: 12/27/2017     lancets Misc   Commonly known as: ACCU-CHEK SOFTCLIX LANCETS   Test 4 times daily     lancets Misc   Commonly known as: ACCU-CHEK MULTICLIX LANCET   Check daily     latanoprost 0.005 % ophthalmic solution   Commonly known as: XALATAN   1 drop, Both Eyes, Nightly     lisinopril 10 mg tablet   Commonly known as: PRINIVIL,ZESTRIL   TAKE 1 TABLET EVERY DAY     metFORMIN 500 mg tablet   Commonly known as: GLUCOPHAGE   TAKE 2 TABLETS TWICE DAILY BEFORE MEALS         Significant Medication Changes:   Patient will complete a short prednisone taper.    Patient has been discharged on Lantus insulin 25 units nightly and recommendations for titration accordingly.    Pro-air HFA inhaler to be used as needed    Patient has completed his course of Tamiflu in the hospital setting.      Condition: Stable, per my  exam    Disposition: Home    Time Spent on Discharge: Greater than 30 minutes.    Quality Metrics: Not terminally ill, flu vaccine is up-to-date, No evidence of DVT/PE no CHF, no pneumonia no sepsis or stroke or TIA.      Scheduled Appts   Monday Mar 19, 2018   Established Patient with Ramses Catherine MD at 10:45 AM (Arrive by 10:30 AM)   Where: [facility] ([facility])             Diet   Discharge Diet   Patient Type: Adult - Diet   Diet-Adult Home Diet Type: Return to previous diet           Activity   Discharge Activity   Instructions: As tolerated         Other   Call MD   Call MD for: Worsening symptoms     Discharge Condition   Condition: Stable     Discharge Follow-Up   Follow up with PCP Dr. Kennedy in 1 week  Follow up with Dr. Greenberg with Endocrinology in 1-2 weeks                         2:34 PM Spoke with Dr. Schafer regarding finding of CT. Suspect PE along with pneumonia.      2:47 PM reviewed Hemoglobin, drop in H and H. From [facility].   Hemoglobin was 12.9 on  12/20, 2017.  Patient has been dropping hemoglobin.  No evidence of any GI bleed.  No melena, hematochezia.  No known history of peptic ulcer disease or AVM.  At this point in time risk benefits were discussed regarding heparin..  We'll go ahead with heparin drip as patient does have documented pulmonary embolism.  We'll continue to monitor hemoglobin and hematocrit.  Patient and family are requesting transfer to [facility]    2:52 PM On phone with SABA Walker NP  All historical, clinical, radiographic, and laboratory findings were reviewed with the patient/family in detail.  I discussed the indications and treatment need (oxgyen, heparin, monitoring hemoglobin and hematocrit) for transfer  to [facility].  Patient/family verbalized understanding.   All remaining questions and concerns were addressed at that time and the patient/family agrees to proceed accordingly.  Similarly all pertinent  details of the encounter were discussed with SABA Walker NP at [facility].  Dr. Derrell Manuel who agrees to accept the patient in transfer based on the needs/patient preferences outlined above.  Appreciate [facility] medicine's care in this very pleasant patient.  Patient will be transferred by [facility] ambulance services   secondary to a need for ongoing oxygen en route, fluids, antibiotics en route.  Risks:    loss of vitals signs, permanent neurologic damage, MVC , GI bleed, resulting in death, or loss of neurologic function.  Benefits of transfer: oxygen, fluids, antibiotics.  Patient and family verbalized understanding.   Leny Hdez,   3:20 PM    4:10 PM  Fluid challenge completed.  Vital signs have been reviewed.  A focused perfusion assessment (septic focused exam) was completed.       .    ED Course      Clinical Impression:       ICD-10-CM ICD-9-CM   1. Pneumonia due to infectious organism, unspecified laterality, unspecified part of lung J18.9 136.9     484.8   2. Hypoxia R09.02 799.02   3. Other acute pulmonary embolism without acute cor pulmonale I26.99 415.19   4. Anemia, unspecified type D64.9 285.9   5. Pulmonary embolism I26.99 415.19       Disposition:   Disposition: Admitted  Condition: Fair                        Leny Hdez, DO  12/29/17 6822

## 2017-12-30 PROBLEM — E11.9 TYPE 2 DIABETES MELLITUS WITHOUT COMPLICATION: Status: ACTIVE | Noted: 2017-12-30

## 2017-12-30 PROBLEM — I26.99 ACUTE PULMONARY EMBOLISM: Status: ACTIVE | Noted: 2017-12-30

## 2017-12-30 PROBLEM — J96.01 ACUTE HYPOXEMIC RESPIRATORY FAILURE: Status: ACTIVE | Noted: 2017-12-30

## 2017-12-30 LAB
ABO + RH BLD: NORMAL
AORTIC VALVE STENOSIS: ABNORMAL
APTT BLDCRRT: 35.8 SEC
BACTERIA UR CULT: NORMAL
BASOPHILS # BLD AUTO: 0.01 K/UL
BASOPHILS NFR BLD: 0 %
BLD GP AB SCN CELLS X3 SERPL QL: NORMAL
DIASTOLIC DYSFUNCTION: NO
DIFFERENTIAL METHOD: ABNORMAL
EOSINOPHIL # BLD AUTO: 0 K/UL
EOSINOPHIL NFR BLD: 0 %
ERYTHROCYTE [DISTWIDTH] IN BLOOD BY AUTOMATED COUNT: 12.8 %
ESTIMATED PA SYSTOLIC PRESSURE: 47.62
FOLATE SERPL-MCNC: 9.1 NG/ML
HCT VFR BLD AUTO: 24.8 %
HCT VFR BLD AUTO: 27.6 %
HCT VFR BLD AUTO: 27.7 %
HCT VFR BLD AUTO: 28.5 %
HGB BLD-MCNC: 7.8 G/DL
HGB BLD-MCNC: 8.8 G/DL
HGB BLD-MCNC: 9 G/DL
HGB BLD-MCNC: 9.1 G/DL
INR PPP: 1.2
LYMPHOCYTES # BLD AUTO: 0.7 K/UL
LYMPHOCYTES NFR BLD: 3.2 %
MCH RBC QN AUTO: 27.1 PG
MCHC RBC AUTO-ENTMCNC: 31.6 G/DL
MCV RBC AUTO: 86 FL
MONOCYTES # BLD AUTO: 0.4 K/UL
MONOCYTES NFR BLD: 2.1 %
NEUTROPHILS # BLD AUTO: 19.6 K/UL
NEUTROPHILS NFR BLD: 94.7 %
PLATELET # BLD AUTO: 434 K/UL
PLATELET # BLD AUTO: 445 K/UL
PMV BLD AUTO: 9.6 FL
PMV BLD AUTO: 9.6 FL
POCT GLUCOSE: 234 MG/DL (ref 70–110)
POCT GLUCOSE: 310 MG/DL (ref 70–110)
POCT GLUCOSE: 369 MG/DL (ref 70–110)
POCT GLUCOSE: 389 MG/DL (ref 70–110)
PROTHROMBIN TIME: 12.5 SEC
RBC # BLD AUTO: 3.32 M/UL
RETIRED EF AND QEF - SEE NOTES: 65 (ref 55–65)
TRICUSPID VALVE REGURGITATION: ABNORMAL
VIT B12 SERPL-MCNC: 1507 PG/ML
WBC # BLD AUTO: 20.68 K/UL

## 2017-12-30 PROCEDURE — 25000003 PHARM REV CODE 250: Performed by: INTERNAL MEDICINE

## 2017-12-30 PROCEDURE — 21400001 HC TELEMETRY ROOM

## 2017-12-30 PROCEDURE — 86850 RBC ANTIBODY SCREEN: CPT

## 2017-12-30 PROCEDURE — 85730 THROMBOPLASTIN TIME PARTIAL: CPT

## 2017-12-30 PROCEDURE — 63600175 PHARM REV CODE 636 W HCPCS: Performed by: INTERNAL MEDICINE

## 2017-12-30 PROCEDURE — 63600175 PHARM REV CODE 636 W HCPCS: Performed by: EMERGENCY MEDICINE

## 2017-12-30 PROCEDURE — 85520 HEPARIN ASSAY: CPT

## 2017-12-30 PROCEDURE — 96372 THER/PROPH/DIAG INJ SC/IM: CPT

## 2017-12-30 PROCEDURE — 85025 COMPLETE CBC W/AUTO DIFF WBC: CPT

## 2017-12-30 PROCEDURE — 25000242 PHARM REV CODE 250 ALT 637 W/ HCPCS: Performed by: EMERGENCY MEDICINE

## 2017-12-30 PROCEDURE — 99900035 HC TECH TIME PER 15 MIN (STAT)

## 2017-12-30 PROCEDURE — 85014 HEMATOCRIT: CPT

## 2017-12-30 PROCEDURE — 85610 PROTHROMBIN TIME: CPT

## 2017-12-30 PROCEDURE — 85018 HEMOGLOBIN: CPT

## 2017-12-30 PROCEDURE — 94640 AIRWAY INHALATION TREATMENT: CPT

## 2017-12-30 PROCEDURE — C8929 TTE W OR WO FOL WCON,DOPPLER: HCPCS

## 2017-12-30 PROCEDURE — 36415 COLL VENOUS BLD VENIPUNCTURE: CPT

## 2017-12-30 PROCEDURE — 25000003 PHARM REV CODE 250: Performed by: EMERGENCY MEDICINE

## 2017-12-30 PROCEDURE — 27000221 HC OXYGEN, UP TO 24 HOURS

## 2017-12-30 PROCEDURE — 85049 AUTOMATED PLATELET COUNT: CPT

## 2017-12-30 PROCEDURE — 93306 TTE W/DOPPLER COMPLETE: CPT | Mod: 26,,, | Performed by: INTERNAL MEDICINE

## 2017-12-30 RX ORDER — GUAIFENESIN 100 MG/5ML
200 SOLUTION ORAL EVERY 4 HOURS PRN
Status: DISCONTINUED | OUTPATIENT
Start: 2017-12-30 | End: 2018-01-02 | Stop reason: HOSPADM

## 2017-12-30 RX ORDER — IBUPROFEN 200 MG
16 TABLET ORAL
Status: DISCONTINUED | OUTPATIENT
Start: 2017-12-30 | End: 2018-01-02 | Stop reason: HOSPADM

## 2017-12-30 RX ORDER — TAMSULOSIN HYDROCHLORIDE 0.4 MG/1
0.4 CAPSULE ORAL DAILY
Status: DISCONTINUED | OUTPATIENT
Start: 2017-12-30 | End: 2018-01-02 | Stop reason: HOSPADM

## 2017-12-30 RX ORDER — LATANOPROST 50 UG/ML
1 SOLUTION/ DROPS OPHTHALMIC NIGHTLY
Status: DISCONTINUED | OUTPATIENT
Start: 2017-12-30 | End: 2018-01-02 | Stop reason: HOSPADM

## 2017-12-30 RX ORDER — HEPARIN SODIUM,PORCINE/D5W 25000/250
17 INTRAVENOUS SOLUTION INTRAVENOUS CONTINUOUS
Status: DISCONTINUED | OUTPATIENT
Start: 2017-12-30 | End: 2018-01-01

## 2017-12-30 RX ORDER — INSULIN ASPART 100 [IU]/ML
0-5 INJECTION, SOLUTION INTRAVENOUS; SUBCUTANEOUS
Status: DISCONTINUED | OUTPATIENT
Start: 2017-12-30 | End: 2017-12-31

## 2017-12-30 RX ORDER — DORZOLAMIDE HYDROCHLORIDE AND TIMOLOL MALEATE 20; 5 MG/ML; MG/ML
1 SOLUTION/ DROPS OPHTHALMIC 2 TIMES DAILY
Status: DISCONTINUED | OUTPATIENT
Start: 2017-12-30 | End: 2018-01-02 | Stop reason: HOSPADM

## 2017-12-30 RX ORDER — GLUCAGON 1 MG
1 KIT INJECTION
Status: DISCONTINUED | OUTPATIENT
Start: 2017-12-30 | End: 2018-01-02 | Stop reason: HOSPADM

## 2017-12-30 RX ORDER — LISINOPRIL 10 MG/1
10 TABLET ORAL DAILY
Status: DISCONTINUED | OUTPATIENT
Start: 2017-12-30 | End: 2018-01-02 | Stop reason: HOSPADM

## 2017-12-30 RX ORDER — ATORVASTATIN CALCIUM 40 MG/1
40 TABLET, FILM COATED ORAL DAILY
Status: DISCONTINUED | OUTPATIENT
Start: 2017-12-30 | End: 2018-01-02 | Stop reason: HOSPADM

## 2017-12-30 RX ORDER — GLUCAGON 1 MG
1 KIT INJECTION
Status: DISCONTINUED | OUTPATIENT
Start: 2017-12-30 | End: 2017-12-30 | Stop reason: SDUPTHER

## 2017-12-30 RX ORDER — IBUPROFEN 200 MG
24 TABLET ORAL
Status: DISCONTINUED | OUTPATIENT
Start: 2017-12-30 | End: 2018-01-02 | Stop reason: HOSPADM

## 2017-12-30 RX ORDER — PANTOPRAZOLE SODIUM 40 MG/1
40 TABLET, DELAYED RELEASE ORAL DAILY
Status: DISCONTINUED | OUTPATIENT
Start: 2017-12-30 | End: 2018-01-02 | Stop reason: HOSPADM

## 2017-12-30 RX ORDER — ALBUTEROL SULFATE 90 UG/1
2 AEROSOL, METERED RESPIRATORY (INHALATION) EVERY 6 HOURS PRN
Status: DISCONTINUED | OUTPATIENT
Start: 2017-12-30 | End: 2017-12-30 | Stop reason: SDUPTHER

## 2017-12-30 RX ADMIN — CIPROFLOXACIN 400 MG: 2 INJECTION, SOLUTION INTRAVENOUS at 01:12

## 2017-12-30 RX ADMIN — METHYLPREDNISOLONE SODIUM SUCCINATE 80 MG: 125 INJECTION, POWDER, FOR SOLUTION INTRAMUSCULAR; INTRAVENOUS at 01:12

## 2017-12-30 RX ADMIN — VANCOMYCIN HYDROCHLORIDE 1250 MG: 1 INJECTION, POWDER, LYOPHILIZED, FOR SOLUTION INTRAVENOUS at 05:12

## 2017-12-30 RX ADMIN — IPRATROPIUM BROMIDE AND ALBUTEROL SULFATE 3 ML: .5; 3 SOLUTION RESPIRATORY (INHALATION) at 07:12

## 2017-12-30 RX ADMIN — METHYLPREDNISOLONE SODIUM SUCCINATE 80 MG: 125 INJECTION, POWDER, FOR SOLUTION INTRAMUSCULAR; INTRAVENOUS at 05:12

## 2017-12-30 RX ADMIN — PIPERACILLIN AND TAZOBACTAM 4.5 G: 4; .5 INJECTION, POWDER, FOR SOLUTION INTRAVENOUS at 07:12

## 2017-12-30 RX ADMIN — INSULIN ASPART 4 UNITS: 100 INJECTION, SOLUTION INTRAVENOUS; SUBCUTANEOUS at 11:12

## 2017-12-30 RX ADMIN — PANTOPRAZOLE SODIUM 40 MG: 40 TABLET, DELAYED RELEASE ORAL at 09:12

## 2017-12-30 RX ADMIN — INSULIN DETEMIR 12 UNITS: 100 INJECTION, SOLUTION SUBCUTANEOUS at 09:12

## 2017-12-30 RX ADMIN — IPRATROPIUM BROMIDE AND ALBUTEROL SULFATE 3 ML: .5; 3 SOLUTION RESPIRATORY (INHALATION) at 01:12

## 2017-12-30 RX ADMIN — IPRATROPIUM BROMIDE AND ALBUTEROL SULFATE 3 ML: .5; 3 SOLUTION RESPIRATORY (INHALATION) at 06:12

## 2017-12-30 RX ADMIN — HEPARIN SODIUM AND DEXTROSE 17 UNITS/KG/HR: 10000; 5 INJECTION INTRAVENOUS at 09:12

## 2017-12-30 RX ADMIN — DORZOLAMIDE HYDROCHLORIDE AND TIMOLOL MALEATE 1 DROP: 20; 5 SOLUTION/ DROPS OPHTHALMIC at 09:12

## 2017-12-30 RX ADMIN — LISINOPRIL 10 MG: 10 TABLET ORAL at 09:12

## 2017-12-30 RX ADMIN — HEPARIN SODIUM AND DEXTROSE 17 UNITS/KG/HR: 10000; 5 INJECTION INTRAVENOUS at 02:12

## 2017-12-30 RX ADMIN — SODIUM CHLORIDE: 0.9 INJECTION, SOLUTION INTRAVENOUS at 12:12

## 2017-12-30 RX ADMIN — METHYLPREDNISOLONE SODIUM SUCCINATE 80 MG: 125 INJECTION, POWDER, FOR SOLUTION INTRAMUSCULAR; INTRAVENOUS at 09:12

## 2017-12-30 RX ADMIN — INSULIN ASPART 3 UNITS: 100 INJECTION, SOLUTION INTRAVENOUS; SUBCUTANEOUS at 09:12

## 2017-12-30 RX ADMIN — GUAIFENESIN 200 MG: 200 SOLUTION ORAL at 04:12

## 2017-12-30 RX ADMIN — CIPROFLOXACIN 400 MG: 2 INJECTION, SOLUTION INTRAVENOUS at 03:12

## 2017-12-30 RX ADMIN — ATORVASTATIN CALCIUM 40 MG: 40 TABLET, FILM COATED ORAL at 09:12

## 2017-12-30 RX ADMIN — INSULIN ASPART 2 UNITS: 100 INJECTION, SOLUTION INTRAVENOUS; SUBCUTANEOUS at 06:12

## 2017-12-30 RX ADMIN — LATANOPROST 1 DROP: 50 SOLUTION OPHTHALMIC at 09:12

## 2017-12-30 RX ADMIN — PIPERACILLIN AND TAZOBACTAM 4.5 G: 4; .5 INJECTION, POWDER, FOR SOLUTION INTRAVENOUS at 09:12

## 2017-12-30 RX ADMIN — INSULIN ASPART 5 UNITS: 100 INJECTION, SOLUTION INTRAVENOUS; SUBCUTANEOUS at 05:12

## 2017-12-30 RX ADMIN — TAMSULOSIN HYDROCHLORIDE 0.4 MG: 0.4 CAPSULE ORAL at 09:12

## 2017-12-30 NOTE — ASSESSMENT & PLAN NOTE
-Accuchecks and insulin sliding scale.   -Will reduce current home insulin to half dose and titrate up or down as needed

## 2017-12-30 NOTE — SUBJECTIVE & OBJECTIVE
Past Medical History:   Diagnosis Date    COPD (chronic obstructive pulmonary disease)     Diabetes mellitus     Hyperlipemia     Prostate atrophy        Past Surgical History:   Procedure Laterality Date    CARPAL TUNNEL RELEASE      WRIST FRACTURE SURGERY         Review of patient's allergies indicates:  No Known Allergies    No current facility-administered medications on file prior to encounter.      Current Outpatient Prescriptions on File Prior to Encounter   Medication Sig    atorvastatin (LIPITOR) 40 MG tablet Take 40 mg by mouth once daily.    [] dextromethorphan-guaifenesin  mg/5 ml (ROBITUSSIN-DM)  mg/5 mL liquid Take 5 mLs by mouth 4 (four) times daily as needed (cough).    lisinopril 10 MG tablet Take 10 mg by mouth once daily.    metFORMIN (GLUCOPHAGE) 500 MG tablet Take 500 mg by mouth 2 (two) times daily with meals.    TAMSULOSIN HCL (FLOMAX ORAL) Take by mouth.     Family History     Problem Relation (Age of Onset)    Diabetes Mother        Social History Main Topics    Smoking status: Former Smoker     Quit date:     Smokeless tobacco: Never Used    Alcohol use No    Drug use: No    Sexual activity: Not on file     Review of Systems   Constitutional: Negative for activity change, appetite change, chills, diaphoresis, fatigue, fever and unexpected weight change.   HENT: Negative for dental problem, ear pain, facial swelling, mouth sores, postnasal drip, rhinorrhea, sinus pressure and sore throat.    Eyes: Negative for photophobia, pain, discharge, redness, itching and visual disturbance.   Respiratory: Positive for cough, chest tightness and shortness of breath. Negative for apnea, choking, wheezing and stridor.    Cardiovascular: Negative for chest pain, palpitations and leg swelling.   Gastrointestinal: Negative for abdominal distention, abdominal pain, anal bleeding, blood in stool, constipation, nausea, rectal pain and vomiting.   Endocrine: Negative for  heat intolerance, polydipsia, polyphagia and polyuria.   Genitourinary: Negative for decreased urine volume, difficulty urinating, dysuria, enuresis, flank pain, frequency and urgency.   Musculoskeletal: Negative for arthralgias, back pain, gait problem, joint swelling, myalgias, neck pain and neck stiffness.   Skin: Negative for color change, pallor, rash and wound.   Neurological: Negative for dizziness, tremors, seizures, syncope, facial asymmetry, weakness, light-headedness, numbness and headaches.   Hematological: Negative for adenopathy. Does not bruise/bleed easily.   Psychiatric/Behavioral: Negative for decreased concentration, dysphoric mood, hallucinations, self-injury, sleep disturbance and suicidal ideas. The patient is not nervous/anxious and is not hyperactive.      Objective:     Vital Signs (Most Recent):  Temp: 98.3 °F (36.8 °C) (12/30/17 0246)  Pulse: 87 (12/30/17 0246)  Resp: 16 (12/30/17 0246)  BP: 131/64 (12/30/17 0246)  SpO2: (!) 92 % (12/30/17 0246) Vital Signs (24h Range):  Temp:  [98.3 °F (36.8 °C)-99.5 °F (37.5 °C)] 98.3 °F (36.8 °C)  Pulse:  [82-98] 87  Resp:  [16-29] 16  SpO2:  [89 %-100 %] 92 %  BP: (105-140)/(54-68) 131/64     Weight: 86.1 kg (189 lb 13.1 oz)  Body mass index is 25.04 kg/m².    Physical Exam   Constitutional: He is oriented to person, place, and time. He appears well-developed and well-nourished. No distress.   HENT:   Head: Normocephalic and atraumatic.   Right Ear: External ear normal.   Left Ear: External ear normal.   Nose: Nose normal.   Mouth/Throat: Oropharynx is clear and moist. No oropharyngeal exudate.   Eyes: Conjunctivae and EOM are normal. Pupils are equal, round, and reactive to light. Right eye exhibits no discharge. Left eye exhibits no discharge. No scleral icterus.   Neck: Normal range of motion. Neck supple. No JVD present. No tracheal deviation present. No thyromegaly present.   Cardiovascular: Normal rate, regular rhythm, normal heart sounds and  intact distal pulses.  Exam reveals no gallop and no friction rub.    No murmur heard.  Pulmonary/Chest: Effort normal. No stridor. No respiratory distress. He has no wheezes. He has rales. He exhibits no tenderness.   Non-productive cough   Abdominal: Soft. Bowel sounds are normal. He exhibits no distension and no mass. There is no tenderness. There is no rebound and no guarding. No hernia.   Musculoskeletal: Normal range of motion. He exhibits no edema, tenderness or deformity.   Lymphadenopathy:     He has no cervical adenopathy.   Neurological: He is alert and oriented to person, place, and time. He displays normal reflexes. No cranial nerve deficit or sensory deficit. He exhibits normal muscle tone. Coordination normal.   Skin: Skin is warm and dry. Capillary refill takes less than 2 seconds. No rash noted. He is not diaphoretic. No erythema. No pallor.   Psychiatric: He has a normal mood and affect. His behavior is normal. Judgment and thought content normal.   Nursing note and vitals reviewed.        CRANIAL NERVES     CN III, IV, VI   Pupils are equal, round, and reactive to light.  Extraocular motions are normal.        Significant Labs:   Recent Lab Results       12/29/17  2345 12/29/17  2130 12/29/17  1834 12/29/17  1444 12/29/17  1443      Procalcitonin          Albumin          Alkaline Phosphatase          Allens Test          ALT          Anion Gap          Aniso          Appearance, UA    Clear      aPTT  39.0  Comment:  aPTT therapeutic range = 39-69 seconds(H)        AST          Baso #          Basophil%          Bilirubin (UA)    Negative      Total Bilirubin          Blood Culture, Routine          BNP          Site          BUN, Bld          Calcium          Chloride          CO2          Color, UA    Yellow      Cortisol          Creatinine          DelSys          Differential Method          eGFR if           eGFR if non           Eos #          Eosinophil%           FiO2          Flu A & B Source     Nasal Swab     Glucose          Glucose, UA    Trace(A)      Gran #          Gran%          Hematocrit 24.8(L)  26.5(L)       Hemoglobin 7.8(L)  8.2(L)       Influenza A Ag, EIA     Negative     Influenza B Ag, EIA     Negative     Coumadin Monitoring INR          Iron          Ketones, UA    Negative      Lactate, Anton   1.3  Comment:  Falsely low lactic acid results can be found in samples   containing >=13.0 mg/dL total bilirubin and/or >=3.5 mg/dL   direct bilirubin.         Leukocytes, UA    Negative      Lipase          Lymph #          Lymph%          Magnesium          MCH          MCHC          MCV          Mode          Mono #          Mono%          MPV          Nitrite, UA    Negative      Occult Blood UA    Negative      Ovalocytes          pH, UA    5.0      Phosphorus          Platelet Estimate          Platelets          POC BE          POC HCO3          POC PCO2          POC PH          POC PO2          POC SATURATED O2          Poik          Poly          Potassium          Total Protein          Protein, UA    Negative  Comment:  Recommend a 24 hour urine protein or a urine   protein/creatinine ratio if globulin induced proteinuria is  clinically suspected.        Protime          RBC          RDW          Retic          Sample          Saturated Iron          Sodium          Specific [place], UA    1.015      Specimen UA    Urine, Clean Catch      TIBC          Transferrin          Troponin I          TSH          Urobilinogen, UA    2.0-3.0(A)      WBC                      12/29/17  1326 12/29/17  1322 12/29/17  1319 12/29/17  1317 12/29/17  1314      Procalcitonin  0.79  Comment:  A concentration < 0.25 ng/mL represents a low risk bacterial   infection.  Procalcitonin may not be accurate among patients with localized   infection, recent trauma or major surgery, immunosuppressed state,   invasive fungal infection, renal dysfunction. Decisions regarding    initiation or continuation of antibiotic therapy should not be based   solely on procalcitonin levels.  (H)        Albumin     2.3(L)     Alkaline Phosphatase     93     Allens Test Pass         ALT     37     Anion Gap     9     Aniso     Slight     Appearance, UA          aPTT     23.4  Comment:  aPTT therapeutic range = 39-69 seconds     AST     27     Baso #     0.02     Basophil%     0.1     Bilirubin (UA)          Total Bilirubin     0.9  Comment:  For infants and newborns, interpretation of results should be based  on gestational age, weight and in agreement with clinical  observations.  Premature Infant recommended reference ranges:  Up to 24 hours.............<8.0 mg/dL  Up to 48 hours............<12.0 mg/dL  3-5 days..................<15.0 mg/dL  6-29 days.................<15.0 mg/dL       Blood Culture, Routine   No Growth to date[P] No Growth to date[P]      BNP     <10  Comment:  Values of less than 100 pg/ml are consistent with non-CHF populations.     Site RR         BUN, Bld     14     Calcium     8.7     Chloride     96     CO2     28     Color, UA          Cortisol     25.8  Comment:  Cortisol Reference Range:  Before 10:00 am: 4.46-22.7 ug/dL  After 5:00 pm:    1.7-14.1 ug/dL       Creatinine     1.0     Dels Room Air         Differential Method     Automated     eGFR if      >60.0     eGFR if non      >60.0  Comment:  Calculation used to obtain the estimated glomerular filtration  rate (eGFR) is the CKD-EPI equation.        Eos #     0.0     Eosinophil%     0.1     FiO2 21         Flu A & B Source          Glucose     163(H)     Glucose, UA          Gran #     16.0(H)     Gran%     82.3(H)     Hematocrit     28.5(L)     Hemoglobin     9.1(L)     Influenza A Ag, EIA          Influenza B Ag, EIA          Coumadin Monitoring INR     1.1  Comment:  Coumadin Therapy:  2.0 - 3.0 for INR for all indicators except mechanical heart valves  and antiphospholipid syndromes  which should use 2.5 - 3.5.       Iron     10(L)     Ketones, UA          Lactate, Anton     1.5  Comment:  Falsely low lactic acid results can be found in samples   containing >=13.0 mg/dL total bilirubin and/or >=3.5 mg/dL   direct bilirubin.       Leukocytes, UA          Lipase     31     Lymph #     1.2     Lymph%     6.3(L)     Magnesium     1.7     MCH     27.3     MCHC     31.9(L)     MCV     86     Mode SPONT         Mono #     1.8(H)     Mono%     9.4     MPV     9.5     Nitrite, UA          Occult Blood UA          Ovalocytes     CANCELED  Comment:  Result canceled by the ancillary     pH, UA          Phosphorus     3.5     Platelet Estimate     Increased(A)     Platelets     525(H)     POC BE 2         POC HCO3 25.9         POC PCO2 34.3(L)         POC PH 7.486(H)         POC PO2 46(LL)         POC SATURATED O2 85(L)         Poik     Slight     Poly     Occasional     Potassium     4.1     Total Protein     7.0     Protein, UA          Protime     11.5     RBC     3.33(L)     RDW     12.2     Retic     2.8(H)     Sample ARTERIAL         Saturated Iron     5(L)     Sodium     133(L)     Specific Gravity, UA          Specimen UA          TIBC     188(L)     Transferrin     127(L)     Troponin I     <0.006  Comment:  The reference interval for Troponin I represents the 99th percentile   cutoff   for our facility and is consistent with 3rd generation assay   performance.       TSH     1.331     Urobilinogen, UA          WBC     19.39(H)                 12/29/17  1312      Procalcitonin      Albumin      Alkaline Phosphatase      Allens Test Pass     ALT      Anion Gap      Aniso      Appearance, UA      aPTT      AST      Baso #      Basophil%      Bilirubin (UA)      Total Bilirubin      Blood Culture, Routine      BNP      Site LR     BUN, Bld      Calcium      Chloride      CO2      Color, UA      Cortisol      Creatinine      DelSys Room Air     Differential Method      eGFR if       eGFR if  non       Eos #      Eosinophil%      FiO2 21     Flu A & B Source      Glucose      Glucose, UA      Gran #      Gran%      Hematocrit      Hemoglobin      Influenza A Ag, EIA      Influenza B Ag, EIA      Coumadin Monitoring INR      Iron      Ketones, UA      Lactate, Anton      Leukocytes, UA      Lipase      Lymph #      Lymph%      Magnesium      MCH      MCHC      MCV      Mode SPONT     Mono #      Mono%      MPV      Nitrite, UA      Occult Blood UA      Ovalocytes      pH, UA      Phosphorus      Platelet Estimate      Platelets      POC BE 1     POC HCO3 25.3     POC PCO2 35.7     POC PH 7.459(H)     POC PO2 46(LL)     POC SATURATED O2 84(L)     Poik      Poly      Potassium      Total Protein      Protein, UA      Protime      RBC      RDW      Retic      Sample ARTERIAL     Saturated Iron      Sodium      Specific Gravity, UA      Specimen UA      TIBC      Transferrin      Troponin I      TSH      Urobilinogen, UA      WBC            Significant Imaging:   Imaging Results          US Lower Extremity Veins Bilateral (Final result)  Result time 12/29/17 17:17:20    Final result by August Warren MD (12/29/17 17:17:20)                 Impression:     There is no evidence of deep venous thrombosis bilateral lower extremities.        Electronically signed by: AUGUST WARREN  Date:     12/29/17  Time:    17:17              Narrative:    Exam: US LOWER EXTREMITY VEINS BILATERAL     Indication:  I26.99 Other pulmonary embolism without acute cor pulmonale;    Findings: There is a documented compressibility and color Doppler flow with normal venous waveform in good calf augmentation response bilateral lower extremities.                             CTA Chest Non-Coronary (PE Study) (Final result)     Abnormal  Result time 12/29/17 14:35:39    Final result by Joey Schafer MD (12/29/17 14:35:39)                 Impression:         Patchy nodular interstitial and alveolar infiltrates are seen within  the lower lobes and to a lesser degree within the right upper lobe centrally which are thought to reflect multifocal airspace disease or aspiration. Followup to resolution recommended.    Small pulmonary embolism suspected within a subsegmental branch of the right upper lobe on image 62, sequence 2. Small clots within the lower lobes bilaterally are not excluded however evaluation of segmental branches is limited due to motion and bolus tracking. The RV to LV ratio measures 1.1 which is equivocal for right heart strain. Consider cardiac echo as clinically warranted.    Moderate adenopathy within the mediastinum and bilateral hilar regions, right greater than left, which are likely reactive.     Findings discussed with KWAKU HALEY at 14:35:12    All CT scans at this facility use dose modulation, iterative reconstruction, and/or weight base dosing when appropriate to reduce radiation dose to as low as reasonably achievable.      Electronically signed by: FREYA VENEGAS MD  Date:     12/29/17  Time:    14:35              Narrative:    Clinical data: Hypoxia.    Axial CTA images through the chest after administration of contrast was performed according to PE CT angiogram study protocol after administration of 100 cc of Omni 350 contrast.    Findings:    No pulmonary embolism is seen within the main pulmonary artery or right/left pulmonary arteries however clot suspected within a subsegmental branch of the right upper lobe on image 62, sequence 2. Evaluation of segmental branches is limited due to motion and bolus tracking.. The RV to LV ratio measures 1.1 which is equivocal for right heart strain. Consider cardiac echo as clinically warranted.    Structures of the base of the neck are normal.    The heart and pericardium are unremarkable.    Mediastinal structures including great vessels, trachea, esophagus are intact.    Moderate adenopathy within the mediastinum and bilateral hilar regions, right greater than  left, which are likely reactive. No evidence of axillary adenopathy..      Patchy nodular interstitial and alveolar infiltrates are seen within the lower lobes and to a lesser degree within the right upper lobe centrally which are thought to reflect multifocal airspace disease or aspiration. Followup.    Surrounding chest wall structures, visualized abdominal organs, bones are unremarkable.                             X-Ray Chest AP Portable (Final result)  Result time 12/29/17 14:03:06    Final result by Angus Sanchez MD (12/29/17 14:03:06)                 Impression:     See above            Electronically signed by: ANGUS SANCHEZ MD  Date:     12/29/17  Time:    14:03              Narrative:    Exam: Portable chest radiograph    Clinical History:   sepsis.     Comparison: None.    Findings:.     There is some retrocardiac left lower lobe atelectasis and or infiltrate and subtle interstitial opacity in the lung bases bilaterally. Heart size appears borderline enlarged. No pleural effusion or pneumothorax.

## 2017-12-30 NOTE — PLAN OF CARE
Problem: Patient Care Overview  Goal: Plan of Care Review  Outcome: Ongoing (interventions implemented as appropriate)  Patient arrived on unit at 0230 AAO and VSS. He was oriented to his surroundings.  Wife and daughter at bedside. Lungs sounds diminished at bases, o/w clear.  IVF administered as ordered into RAC. Heparin 17 units/kg/hr infusing  at 14.6 ml/hr into LAC.  Denies pain.  Remains free of injury. Fall precautions maintained with bed low and wheels locked. Chart review for 24 hours completed. Will continue to monitor till discharge.

## 2017-12-30 NOTE — ED NOTES
[facility] dispatcherLos called to inform of delay in transport due to need for CCT OBED lugo 45 mins to 1 hour

## 2017-12-30 NOTE — H&P
[facility] Hospital Medicine  History & Physical    Patient Name: Uday Reyna  MRN: [id number]  Admission Date: 2017  Attending Physician: Guanaco Arango MD   Primary Care Provider: Wallace Kennedy MD         Patient information was obtained from past medical records and ER records.     Subjective:     Principal Problem:Acute hypoxemic respiratory failure    Chief Complaint:   Chief Complaint   Patient presents with    Shortness of Breath     HH nurse sent patient to the ED for low O2 sat; pt has history of flu; recent hospitalization ([facility])        HPI: The patient is a 77-year old gentleman presenting to [facility] ER following a recent diagnosis and treatment for influenza while hospitalized at [facility]. He was discharged and returned to [facility] ER with complains of persistent non-productive cough and hypoxia noted by the home health aide. He also had a low grade fever. He was diagnosed by CT chest with pulmonary embolism at [facility] and transferred for continued care as an inpatient.      Past Medical History:   Diagnosis Date    COPD (chronic obstructive pulmonary disease)     Diabetes mellitus     Hyperlipemia     Prostate atrophy        Past Surgical History:   Procedure Laterality Date    CARPAL TUNNEL RELEASE      WRIST FRACTURE SURGERY         Review of patient's allergies indicates:  No Known Allergies    No current facility-administered medications on file prior to encounter.      Current Outpatient Prescriptions on File Prior to Encounter   Medication Sig    atorvastatin (LIPITOR) 40 MG tablet Take 40 mg by mouth once daily.    [] dextromethorphan-guaifenesin  mg/5 ml (ROBITUSSIN-DM)  mg/5 mL liquid Take 5 mLs by mouth 4 (four) times daily as needed (cough).    lisinopril 10 MG tablet Take 10 mg by mouth once daily.    metFORMIN (GLUCOPHAGE) 500 MG tablet Take 500 mg by mouth 2 (two) times daily with meals.    TAMSULOSIN HCL (FLOMAX ORAL)  Take by mouth.     Family History     Problem Relation (Age of Onset)    Diabetes Mother        Social History Main Topics    Smoking status: Former Smoker     Quit date: 1985    Smokeless tobacco: Never Used    Alcohol use No    Drug use: No    Sexual activity: Not on file     Review of Systems   Constitutional: Negative for activity change, appetite change, chills, diaphoresis, fatigue, fever and unexpected weight change.   HENT: Negative for dental problem, ear pain, facial swelling, mouth sores, postnasal drip, rhinorrhea, sinus pressure and sore throat.    Eyes: Negative for photophobia, pain, discharge, redness, itching and visual disturbance.   Respiratory: Positive for cough, chest tightness and shortness of breath. Negative for apnea, choking, wheezing and stridor.    Cardiovascular: Negative for chest pain, palpitations and leg swelling.   Gastrointestinal: Negative for abdominal distention, abdominal pain, anal bleeding, blood in stool, constipation, nausea, rectal pain and vomiting.   Endocrine: Negative for heat intolerance, polydipsia, polyphagia and polyuria.   Genitourinary: Negative for decreased urine volume, difficulty urinating, dysuria, enuresis, flank pain, frequency and urgency.   Musculoskeletal: Negative for arthralgias, back pain, gait problem, joint swelling, myalgias, neck pain and neck stiffness.   Skin: Negative for color change, pallor, rash and wound.   Neurological: Negative for dizziness, tremors, seizures, syncope, facial asymmetry, weakness, light-headedness, numbness and headaches.   Hematological: Negative for adenopathy. Does not bruise/bleed easily.   Psychiatric/Behavioral: Negative for decreased concentration, dysphoric mood, hallucinations, self-injury, sleep disturbance and suicidal ideas. The patient is not nervous/anxious and is not hyperactive.      Objective:     Vital Signs (Most Recent):  Temp: 98.3 °F (36.8 °C) (12/30/17 0246)  Pulse: 87 (12/30/17 0246)  Resp:  16 (12/30/17 0246)  BP: 131/64 (12/30/17 0246)  SpO2: (!) 92 % (12/30/17 0246) Vital Signs (24h Range):  Temp:  [98.3 °F (36.8 °C)-99.5 °F (37.5 °C)] 98.3 °F (36.8 °C)  Pulse:  [82-98] 87  Resp:  [16-29] 16  SpO2:  [89 %-100 %] 92 %  BP: (105-140)/(54-68) 131/64     Weight: 86.1 kg (189 lb 13.1 oz)  Body mass index is 25.04 kg/m².    Physical Exam   Constitutional: He is oriented to person, place, and time. He appears well-developed and well-nourished. No distress.   HENT:   Head: Normocephalic and atraumatic.   Right Ear: External ear normal.   Left Ear: External ear normal.   Nose: Nose normal.   Mouth/Throat: Oropharynx is clear and moist. No oropharyngeal exudate.   Eyes: Conjunctivae and EOM are normal. Pupils are equal, round, and reactive to light. Right eye exhibits no discharge. Left eye exhibits no discharge. No scleral icterus.   Neck: Normal range of motion. Neck supple. No JVD present. No tracheal deviation present. No thyromegaly present.   Cardiovascular: Normal rate, regular rhythm, normal heart sounds and intact distal pulses.  Exam reveals no gallop and no friction rub.    No murmur heard.  Pulmonary/Chest: Effort normal. No stridor. No respiratory distress. He has no wheezes. He has rales. He exhibits no tenderness.   Non-productive cough   Abdominal: Soft. Bowel sounds are normal. He exhibits no distension and no mass. There is no tenderness. There is no rebound and no guarding. No hernia.   Musculoskeletal: Normal range of motion. He exhibits no edema, tenderness or deformity.   Lymphadenopathy:     He has no cervical adenopathy.   Neurological: He is alert and oriented to person, place, and time. He displays normal reflexes. No cranial nerve deficit or sensory deficit. He exhibits normal muscle tone. Coordination normal.   Skin: Skin is warm and dry. Capillary refill takes less than 2 seconds. No rash noted. He is not diaphoretic. No erythema. No pallor.   Psychiatric: He has a normal mood and  affect. His behavior is normal. Judgment and thought content normal.   Nursing note and vitals reviewed.        CRANIAL NERVES     CN III, IV, VI   Pupils are equal, round, and reactive to light.  Extraocular motions are normal.        Significant Labs:   Recent Lab Results       12/29/17  2345 12/29/17  2130 12/29/17  1834 12/29/17  1444 12/29/17  1443      Procalcitonin          Albumin          Alkaline Phosphatase          Allens Test          ALT          Anion Gap          Aniso          Appearance, UA    Clear      aPTT  39.0  Comment:  aPTT therapeutic range = 39-69 seconds(H)        AST          Baso #          Basophil%          Bilirubin (UA)    Negative      Total Bilirubin          Blood Culture, Routine          BNP          Site          BUN, Bld          Calcium          Chloride          CO2          Color, UA    Yellow      Cortisol          Creatinine          DelSys          Differential Method          eGFR if           eGFR if non           Eos #          Eosinophil%          FiO2          Flu A & B Source     Nasal Swab     Glucose          Glucose, UA    Trace(A)      Gran #          Gran%          Hematocrit 24.8(L)  26.5(L)       Hemoglobin 7.8(L)  8.2(L)       Influenza A Ag, EIA     Negative     Influenza B Ag, EIA     Negative     Coumadin Monitoring INR          Iron          Ketones, UA    Negative      Lactate, Anton   1.3  Comment:  Falsely low lactic acid results can be found in samples   containing >=13.0 mg/dL total bilirubin and/or >=3.5 mg/dL   direct bilirubin.         Leukocytes, UA    Negative      Lipase          Lymph #          Lymph%          Magnesium          MCH          MCHC          MCV          Mode          Mono #          Mono%          MPV          Nitrite, UA    Negative      Occult Blood UA    Negative      Ovalocytes          pH, UA    5.0      Phosphorus          Platelet Estimate          Platelets          POC BE          POC  HCO3          POC PCO2          POC PH          POC PO2          POC SATURATED O2          Poik          Poly          Potassium          Total Protein          Protein, UA    Negative  Comment:  Recommend a 24 hour urine protein or a urine   protein/creatinine ratio if globulin induced proteinuria is  clinically suspected.        Protime          RBC          RDW          Retic          Sample          Saturated Iron          Sodium          Specific [place], UA    1.015      Specimen UA    Urine, Clean Catch      TIBC          Transferrin          Troponin I          TSH          Urobilinogen, UA    2.0-3.0(A)      WBC                      12/29/17  1326 12/29/17  1322 12/29/17  1319 12/29/17  1317 12/29/17  1314      Procalcitonin  0.79  Comment:  A concentration < 0.25 ng/mL represents a low risk bacterial   infection.  Procalcitonin may not be accurate among patients with localized   infection, recent trauma or major surgery, immunosuppressed state,   invasive fungal infection, renal dysfunction. Decisions regarding   initiation or continuation of antibiotic therapy should not be based   solely on procalcitonin levels.  (H)        Albumin     2.3(L)     Alkaline Phosphatase     93     Allens Test Pass         ALT     37     Anion Gap     9     Aniso     Slight     Appearance, UA          aPTT     23.4  Comment:  aPTT therapeutic range = 39-69 seconds     AST     27     Baso #     0.02     Basophil%     0.1     Bilirubin (UA)          Total Bilirubin     0.9  Comment:  For infants and newborns, interpretation of results should be based  on gestational age, weight and in agreement with clinical  observations.  Premature Infant recommended reference ranges:  Up to 24 hours.............<8.0 mg/dL  Up to 48 hours............<12.0 mg/dL  3-5 days..................<15.0 mg/dL  6-29 days.................<15.0 mg/dL       Blood Culture, Routine   No Growth to date[P] No Growth to date[P]      BNP      <10  Comment:  Values of less than 100 pg/ml are consistent with non-CHF populations.     Site RR         BUN, Bld     14     Calcium     8.7     Chloride     96     CO2     28     Color, UA          Cortisol     25.8  Comment:  Cortisol Reference Range:  Before 10:00 am: 4.46-22.7 ug/dL  After 5:00 pm:    1.7-14.1 ug/dL       Creatinine     1.0     DelSys Room Air         Differential Method     Automated     eGFR if      >60.0     eGFR if non      >60.0  Comment:  Calculation used to obtain the estimated glomerular filtration  rate (eGFR) is the CKD-EPI equation.        Eos #     0.0     Eosinophil%     0.1     FiO2 21         Flu A & B Source          Glucose     163(H)     Glucose, UA          Gran #     16.0(H)     Gran%     82.3(H)     Hematocrit     28.5(L)     Hemoglobin     9.1(L)     Influenza A Ag, EIA          Influenza B Ag, EIA          Coumadin Monitoring INR     1.1  Comment:  Coumadin Therapy:  2.0 - 3.0 for INR for all indicators except mechanical heart valves  and antiphospholipid syndromes which should use 2.5 - 3.5.       Iron     10(L)     Ketones, UA          Lactate, Anton     1.5  Comment:  Falsely low lactic acid results can be found in samples   containing >=13.0 mg/dL total bilirubin and/or >=3.5 mg/dL   direct bilirubin.       Leukocytes, UA          Lipase     31     Lymph #     1.2     Lymph%     6.3(L)     Magnesium     1.7     MCH     27.3     MCHC     31.9(L)     MCV     86     Mode SPONT         Mono #     1.8(H)     Mono%     9.4     MPV     9.5     Nitrite, UA          Occult Blood UA          Ovalocytes     CANCELED  Comment:  Result canceled by the ancillary     pH, UA          Phosphorus     3.5     Platelet Estimate     Increased(A)     Platelets     525(H)     POC BE 2         POC HCO3 25.9         POC PCO2 34.3(L)         POC PH 7.486(H)         POC PO2 46(LL)         POC SATURATED O2 85(L)         Poik     Slight     Poly     Occasional      Potassium     4.1     Total Protein     7.0     Protein, UA          Protime     11.5     RBC     3.33(L)     RDW     12.2     Retic     2.8(H)     Sample ARTERIAL         Saturated Iron     5(L)     Sodium     133(L)     Specific Gravity, UA          Specimen UA          TIBC     188(L)     Transferrin     127(L)     Troponin I     <0.006  Comment:  The reference interval for Troponin I represents the 99th percentile   cutoff   for our facility and is consistent with 3rd generation assay   performance.       TSH     1.331     Urobilinogen, UA          WBC     19.39(H)                 12/29/17  1312      Procalcitonin      Albumin      Alkaline Phosphatase      Allens Test Pass     ALT      Anion Gap      Aniso      Appearance, UA      aPTT      AST      Baso #      Basophil%      Bilirubin (UA)      Total Bilirubin      Blood Culture, Routine      BNP      Site LR     BUN, Bld      Calcium      Chloride      CO2      Color, UA      Cortisol      Creatinine      DelSys Room Air     Differential Method      eGFR if       eGFR if non       Eos #      Eosinophil%      FiO2 21     Flu A & B Source      Glucose      Glucose, UA      Gran #      Gran%      Hematocrit      Hemoglobin      Influenza A Ag, EIA      Influenza B Ag, EIA      Coumadin Monitoring INR      Iron      Ketones, UA      Lactate, Anton      Leukocytes, UA      Lipase      Lymph #      Lymph%      Magnesium      MCH      MCHC      MCV      Mode SPONT     Mono #      Mono%      MPV      Nitrite, UA      Occult Blood UA      Ovalocytes      pH, UA      Phosphorus      Platelet Estimate      Platelets      POC BE 1     POC HCO3 25.3     POC PCO2 35.7     POC PH 7.459(H)     POC PO2 46(LL)     POC SATURATED O2 84(L)     Poik      Poly      Potassium      Total Protein      Protein, UA      Protime      RBC      RDW      Retic      Sample ARTERIAL     Saturated Iron      Sodium      Specific Gravity, UA      Specimen UA       TIBC      Transferrin      Troponin I      TSH      Urobilinogen, UA      WBC            Significant Imaging:   Imaging Results          US Lower Extremity Veins Bilateral (Final result)  Result time 12/29/17 17:17:20    Final result by Jt Warren MD (12/29/17 17:17:20)                 Impression:     There is no evidence of deep venous thrombosis bilateral lower extremities.        Electronically signed by: JT WARREN  Date:     12/29/17  Time:    17:17              Narrative:    Exam: US LOWER EXTREMITY VEINS BILATERAL     Indication:  I26.99 Other pulmonary embolism without acute cor pulmonale;    Findings: There is a documented compressibility and color Doppler flow with normal venous waveform in good calf augmentation response bilateral lower extremities.                             CTA Chest Non-Coronary (PE Study) (Final result)     Abnormal  Result time 12/29/17 14:35:39    Final result by Joey Schafer MD (12/29/17 14:35:39)                 Impression:         Patchy nodular interstitial and alveolar infiltrates are seen within the lower lobes and to a lesser degree within the right upper lobe centrally which are thought to reflect multifocal airspace disease or aspiration. Followup to resolution recommended.    Small pulmonary embolism suspected within a subsegmental branch of the right upper lobe on image 62, sequence 2. Small clots within the lower lobes bilaterally are not excluded however evaluation of segmental branches is limited due to motion and bolus tracking. The RV to LV ratio measures 1.1 which is equivocal for right heart strain. Consider cardiac echo as clinically warranted.    Moderate adenopathy within the mediastinum and bilateral hilar regions, right greater than left, which are likely reactive.     Findings discussed with KWAKU HALEY at 14:35:12    All CT scans at this facility use dose modulation, iterative reconstruction, and/or weight base dosing when appropriate to  reduce radiation dose to as low as reasonably achievable.      Electronically signed by: FREYA VENEGAS MD  Date:     12/29/17  Time:    14:35              Narrative:    Clinical data: Hypoxia.    Axial CTA images through the chest after administration of contrast was performed according to PE CT angiogram study protocol after administration of 100 cc of Omni 350 contrast.    Findings:    No pulmonary embolism is seen within the main pulmonary artery or right/left pulmonary arteries however clot suspected within a subsegmental branch of the right upper lobe on image 62, sequence 2. Evaluation of segmental branches is limited due to motion and bolus tracking.. The RV to LV ratio measures 1.1 which is equivocal for right heart strain. Consider cardiac echo as clinically warranted.    Structures of the base of the neck are normal.    The heart and pericardium are unremarkable.    Mediastinal structures including great vessels, trachea, esophagus are intact.    Moderate adenopathy within the mediastinum and bilateral hilar regions, right greater than left, which are likely reactive. No evidence of axillary adenopathy..      Patchy nodular interstitial and alveolar infiltrates are seen within the lower lobes and to a lesser degree within the right upper lobe centrally which are thought to reflect multifocal airspace disease or aspiration. Followup.    Surrounding chest wall structures, visualized abdominal organs, bones are unremarkable.                             X-Ray Chest AP Portable (Final result)  Result time 12/29/17 14:03:06    Final result by Angus Dodd MD (12/29/17 14:03:06)                 Impression:     See above            Electronically signed by: ANGUS DODD MD  Date:     12/29/17  Time:    14:03              Narrative:    Exam: Portable chest radiograph    Clinical History:   sepsis.     Comparison: None.    Findings:.     There is some retrocardiac left lower lobe atelectasis and or infiltrate and  subtle interstitial opacity in the lung bases bilaterally. Heart size appears borderline enlarged. No pleural effusion or pneumothorax.                                Assessment/Plan:     * Acute hypoxemic respiratory failure    -Treat underlying cause.   -Oxygen supplementation.           Type 2 diabetes mellitus without complication    -Accuchecks and insulin sliding scale.   -Will reduce current home insulin to half dose and titrate up or down as needed          Acute pulmonary embolism    -IV heparin, for now. Oral anticoagulation later.  -Investigate potential sources. Hospitalization for 6 days at the [facility] only culpable factor immediately identifiable.     Total critical care time spent on the patient excluding any separately billable procedure is 37 minutes.           Pneumonia due to infectious organism    -IV antibiotics.   -Follow up sputum and blood cultures.             VTE Risk Mitigation         Ordered     heparin 25,000 units in dextrose 5% 250 mL (100 units/mL) infusion; MALE  Continuous     Route:  Intravenous        12/30/17 0254     heparin 25,000 units in dextrose 5% 250 mL (100 units/mL) bolus from bag; PRN BOLUS  As needed (PRN)     Route:  Intravenous        12/30/17 0254     heparin 25,000 units in dextrose 5% 250 mL (100 units/mL) bolus from bag; PRN BOLUS  As needed (PRN)     Route:  Intravenous        12/30/17 0254     Medium Risk of VTE  Once      12/30/17 0240     Reason for No Pharmacological VTE Prophylaxis  Once      12/30/17 0240             KORINA Carter  Department of Hospital Medicine   [facility]

## 2017-12-30 NOTE — HPI
Uday Reyna is a 77-year old gentleman presenting to [facility] ER following a recent diagnosis and treatment for influenza while hospitalized at [facility]. He was discharged and returned to the [facility] ER with complains of persistent non-productive cough and hypoxia noted by the home health aide. He also had a low grade fever. He was diagnosed by CTA chest with pulmonary embolism at [facility] and transferred for continued care as an inpatient.

## 2017-12-30 NOTE — ASSESSMENT & PLAN NOTE
-IV heparin, for now. Oral anticoagulation later.  -Investigate potential sources. Hospitalization for 6 days at the [facility] only culpable factor immediately identifiable.     Total critical care time spent on the patient excluding any separately billable procedure is 37 minutes.

## 2017-12-31 LAB
ANISOCYTOSIS BLD QL SMEAR: SLIGHT
APTT BLDCRRT: 49.6 SEC
BASOPHILS # BLD AUTO: 0.01 K/UL
BASOPHILS NFR BLD: 0 %
DIFFERENTIAL METHOD: ABNORMAL
EOSINOPHIL # BLD AUTO: 0 K/UL
EOSINOPHIL NFR BLD: 0 %
ERYTHROCYTE [DISTWIDTH] IN BLOOD BY AUTOMATED COUNT: 11.8 %
ESTIMATED AVG GLUCOSE: 174 MG/DL
FACT X PPP CHRO-ACNC: 0.2 IU/ML
FACT X PPP CHRO-ACNC: 0.31 IU/ML
GIANT PLATELETS BLD QL SMEAR: PRESENT
HBA1C MFR BLD HPLC: 7.7 %
HCT VFR BLD AUTO: 24.9 %
HCT VFR BLD AUTO: 26.1 %
HGB BLD-MCNC: 7.7 G/DL
HGB BLD-MCNC: 8.1 G/DL
LYMPHOCYTES # BLD AUTO: 0.9 K/UL
LYMPHOCYTES NFR BLD: 3.5 %
MCH RBC QN AUTO: 27.6 PG
MCHC RBC AUTO-ENTMCNC: 31 G/DL
MCV RBC AUTO: 89 FL
MONOCYTES # BLD AUTO: 1.1 K/UL
MONOCYTES NFR BLD: 4.3 %
NEUTROPHILS # BLD AUTO: 22.7 K/UL
NEUTROPHILS NFR BLD: 92.2 %
PLATELET # BLD AUTO: 498 K/UL
PLATELET BLD QL SMEAR: ABNORMAL
PMV BLD AUTO: 9.8 FL
POCT GLUCOSE: 282 MG/DL (ref 70–110)
POCT GLUCOSE: 284 MG/DL (ref 70–110)
POCT GLUCOSE: 379 MG/DL (ref 70–110)
POCT GLUCOSE: 426 MG/DL (ref 70–110)
POIKILOCYTOSIS BLD QL SMEAR: SLIGHT
POLYCHROMASIA BLD QL SMEAR: ABNORMAL
RBC # BLD AUTO: 2.94 M/UL
VANCOMYCIN TROUGH SERPL-MCNC: 8.7 UG/ML
WBC # BLD AUTO: 24.57 K/UL

## 2017-12-31 PROCEDURE — 25000003 PHARM REV CODE 250: Performed by: EMERGENCY MEDICINE

## 2017-12-31 PROCEDURE — 85025 COMPLETE CBC W/AUTO DIFF WBC: CPT

## 2017-12-31 PROCEDURE — 92610 EVALUATE SWALLOWING FUNCTION: CPT

## 2017-12-31 PROCEDURE — 85018 HEMOGLOBIN: CPT

## 2017-12-31 PROCEDURE — 85520 HEPARIN ASSAY: CPT

## 2017-12-31 PROCEDURE — 83036 HEMOGLOBIN GLYCOSYLATED A1C: CPT

## 2017-12-31 PROCEDURE — 94640 AIRWAY INHALATION TREATMENT: CPT

## 2017-12-31 PROCEDURE — 85014 HEMATOCRIT: CPT

## 2017-12-31 PROCEDURE — 63600175 PHARM REV CODE 636 W HCPCS: Performed by: EMERGENCY MEDICINE

## 2017-12-31 PROCEDURE — 25000242 PHARM REV CODE 250 ALT 637 W/ HCPCS: Performed by: EMERGENCY MEDICINE

## 2017-12-31 PROCEDURE — 63600175 PHARM REV CODE 636 W HCPCS: Performed by: INTERNAL MEDICINE

## 2017-12-31 PROCEDURE — 80202 ASSAY OF VANCOMYCIN: CPT

## 2017-12-31 PROCEDURE — G8997 SWALLOW GOAL STATUS: HCPCS | Mod: CJ

## 2017-12-31 PROCEDURE — 25000003 PHARM REV CODE 250: Performed by: INTERNAL MEDICINE

## 2017-12-31 PROCEDURE — 36415 COLL VENOUS BLD VENIPUNCTURE: CPT

## 2017-12-31 PROCEDURE — 85730 THROMBOPLASTIN TIME PARTIAL: CPT

## 2017-12-31 PROCEDURE — 21400001 HC TELEMETRY ROOM

## 2017-12-31 PROCEDURE — 27000221 HC OXYGEN, UP TO 24 HOURS

## 2017-12-31 PROCEDURE — G8996 SWALLOW CURRENT STATUS: HCPCS | Mod: CK

## 2017-12-31 RX ORDER — INSULIN ASPART 100 [IU]/ML
1-10 INJECTION, SOLUTION INTRAVENOUS; SUBCUTANEOUS
Status: DISCONTINUED | OUTPATIENT
Start: 2017-12-31 | End: 2018-01-02 | Stop reason: HOSPADM

## 2017-12-31 RX ADMIN — INSULIN ASPART 5 UNITS: 100 INJECTION, SOLUTION INTRAVENOUS; SUBCUTANEOUS at 08:12

## 2017-12-31 RX ADMIN — IPRATROPIUM BROMIDE AND ALBUTEROL SULFATE 3 ML: .5; 3 SOLUTION RESPIRATORY (INHALATION) at 06:12

## 2017-12-31 RX ADMIN — METHYLPREDNISOLONE SODIUM SUCCINATE 80 MG: 125 INJECTION, POWDER, FOR SOLUTION INTRAMUSCULAR; INTRAVENOUS at 06:12

## 2017-12-31 RX ADMIN — GUAIFENESIN 200 MG: 200 SOLUTION ORAL at 10:12

## 2017-12-31 RX ADMIN — PANTOPRAZOLE SODIUM 40 MG: 40 TABLET, DELAYED RELEASE ORAL at 10:12

## 2017-12-31 RX ADMIN — PIPERACILLIN AND TAZOBACTAM 4.5 G: 4; .5 INJECTION, POWDER, FOR SOLUTION INTRAVENOUS at 01:12

## 2017-12-31 RX ADMIN — ATORVASTATIN CALCIUM 40 MG: 40 TABLET, FILM COATED ORAL at 10:12

## 2017-12-31 RX ADMIN — DORZOLAMIDE HYDROCHLORIDE AND TIMOLOL MALEATE 1 DROP: 20; 5 SOLUTION/ DROPS OPHTHALMIC at 08:12

## 2017-12-31 RX ADMIN — METHYLPREDNISOLONE SODIUM SUCCINATE 80 MG: 125 INJECTION, POWDER, FOR SOLUTION INTRAMUSCULAR; INTRAVENOUS at 09:12

## 2017-12-31 RX ADMIN — SODIUM CHLORIDE: 0.9 INJECTION, SOLUTION INTRAVENOUS at 10:12

## 2017-12-31 RX ADMIN — CIPROFLOXACIN 400 MG: 2 INJECTION, SOLUTION INTRAVENOUS at 01:12

## 2017-12-31 RX ADMIN — LISINOPRIL 10 MG: 10 TABLET ORAL at 10:12

## 2017-12-31 RX ADMIN — INSULIN ASPART 8 UNITS: 100 INJECTION, SOLUTION INTRAVENOUS; SUBCUTANEOUS at 05:12

## 2017-12-31 RX ADMIN — TAMSULOSIN HYDROCHLORIDE 0.4 MG: 0.4 CAPSULE ORAL at 10:12

## 2017-12-31 RX ADMIN — CIPROFLOXACIN 400 MG: 2 INJECTION, SOLUTION INTRAVENOUS at 02:12

## 2017-12-31 RX ADMIN — VANCOMYCIN HYDROCHLORIDE 1250 MG: 1 INJECTION, POWDER, LYOPHILIZED, FOR SOLUTION INTRAVENOUS at 07:12

## 2017-12-31 RX ADMIN — PIPERACILLIN AND TAZOBACTAM 4.5 G: 4; .5 INJECTION, POWDER, FOR SOLUTION INTRAVENOUS at 10:12

## 2017-12-31 RX ADMIN — INSULIN ASPART 5 UNITS: 100 INJECTION, SOLUTION INTRAVENOUS; SUBCUTANEOUS at 12:12

## 2017-12-31 RX ADMIN — IPRATROPIUM BROMIDE AND ALBUTEROL SULFATE 3 ML: .5; 3 SOLUTION RESPIRATORY (INHALATION) at 01:12

## 2017-12-31 RX ADMIN — INSULIN DETEMIR 12 UNITS: 100 INJECTION, SOLUTION SUBCUTANEOUS at 08:12

## 2017-12-31 RX ADMIN — PIPERACILLIN AND TAZOBACTAM 4.5 G: 4; .5 INJECTION, POWDER, FOR SOLUTION INTRAVENOUS at 08:12

## 2017-12-31 RX ADMIN — INSULIN ASPART 2 UNITS: 100 INJECTION, SOLUTION INTRAVENOUS; SUBCUTANEOUS at 07:12

## 2017-12-31 RX ADMIN — HEPARIN SODIUM AND DEXTROSE 17 UNITS/KG/HR: 10000; 5 INJECTION INTRAVENOUS at 10:12

## 2017-12-31 RX ADMIN — VANCOMYCIN HYDROCHLORIDE 1250 MG: 1 INJECTION, POWDER, LYOPHILIZED, FOR SOLUTION INTRAVENOUS at 06:12

## 2017-12-31 RX ADMIN — DORZOLAMIDE HYDROCHLORIDE AND TIMOLOL MALEATE 1 DROP: 20; 5 SOLUTION/ DROPS OPHTHALMIC at 10:12

## 2017-12-31 RX ADMIN — METHYLPREDNISOLONE SODIUM SUCCINATE 80 MG: 125 INJECTION, POWDER, FOR SOLUTION INTRAMUSCULAR; INTRAVENOUS at 02:12

## 2017-12-31 RX ADMIN — LATANOPROST 1 DROP: 50 SOLUTION OPHTHALMIC at 08:12

## 2017-12-31 NOTE — PLAN OF CARE
Problem: Patient Care Overview  Goal: Plan of Care Review  Outcome: Ongoing (interventions implemented as appropriate)  Pt remains free of falls and free of injury this shift. Family has remained at bedside at all times and has been very helpful in tending to the pt's every need. Pt very appreciative of all and everything staff did for him throughout the shift. Blood glucose monitored as directed; IV antibiotics administered as directed.

## 2017-12-31 NOTE — ASSESSMENT & PLAN NOTE
-IV heparin, for now. Oral anticoagulation later.  -Investigate potential sources. Hospitalization for 6 days at the [facility] only culpable factor immediately identifiable.     Total critical care time spent on the patient excluding any separately billable procedure is 37 minutes.       12/31- will continue heparin drip , will be started on eliquis in am

## 2017-12-31 NOTE — HOSPITAL COURSE
12/31- 77 year old man admitted with pneumonia /acute PE.  WBC is 24 today-increased from 20 .    1/1 - WBC remains elevated at 23.55. Patient clinically improving. I had a lengthy discussion with the patient and family. The patient will need and has agreed to SNF placement. Plan for eliquis for PE    1/2- Patient 76 yo male admitted to hospital with SOB, determined to have an acute PE. Patient additionally with a pneumonia and initiated on antibiotics. Patient placed on Eliquis for his PE. Patient evaluated by PT / OT with recommendations for home with home health support. Patient evaluated for home O2 needs and patient did not require HH. Patient was further seen by speech therapy and evaluated with barium swallow  With no significant findings. Patient seen and examined today prior to his discharge and deemed stable for discharge to home .

## 2017-12-31 NOTE — ASSESSMENT & PLAN NOTE
-IV antibiotics.   -Follow up sputum and blood cultures.      12/31-will do modified barium swallow and will need to rule out aspiration pneumonia   Continue vanco,zosyn and cipro for now    Check cbc in am   supportive care

## 2017-12-31 NOTE — SUBJECTIVE & OBJECTIVE
Interval History:   77 year old man with new acute PE and pneumonia . Aspiration pneumonia is suspected .   Review of Systems   Constitutional: Negative for activity change, appetite change, chills, diaphoresis, fatigue, fever and unexpected weight change.   HENT: Negative for dental problem, ear pain, facial swelling, mouth sores, postnasal drip, rhinorrhea, sinus pressure and sore throat.    Eyes: Negative for photophobia, pain, discharge, redness, itching and visual disturbance.   Respiratory: Positive for cough, chest tightness and shortness of breath. Negative for apnea, choking, wheezing and stridor.    Cardiovascular: Negative for chest pain, palpitations and leg swelling.   Gastrointestinal: Negative for abdominal distention, abdominal pain, anal bleeding, blood in stool, constipation, nausea, rectal pain and vomiting.   Endocrine: Negative for heat intolerance, polydipsia, polyphagia and polyuria.   Genitourinary: Negative for decreased urine volume, difficulty urinating, dysuria, enuresis, flank pain, frequency and urgency.   Musculoskeletal: Negative for arthralgias, back pain, gait problem, joint swelling, myalgias, neck pain and neck stiffness.   Skin: Negative for color change, pallor, rash and wound.   Neurological: Negative for dizziness, tremors, seizures, syncope, facial asymmetry, weakness, light-headedness, numbness and headaches.   Hematological: Negative for adenopathy. Does not bruise/bleed easily.   Psychiatric/Behavioral: Negative for decreased concentration, dysphoric mood, hallucinations, self-injury, sleep disturbance and suicidal ideas. The patient is not nervous/anxious and is not hyperactive.      Objective:     Vital Signs (Most Recent):  Temp: 97.6 °F (36.4 °C) (12/31/17 1603)  Pulse: 70 (12/31/17 1603)  Resp: 20 (12/31/17 1603)  BP: 137/71 (12/31/17 1603)  SpO2: 95 % (12/31/17 1603) Vital Signs (24h Range):  Temp:  [97.6 °F (36.4 °C)-98.6 °F (37 °C)] 97.6 °F (36.4 °C)  Pulse:   [70-92] 70  Resp:  [16-20] 20  SpO2:  [93 %-98 %] 95 %  BP: (121-137)/(59-71) 137/71     Weight: 88.4 kg (194 lb 14.2 oz)  Body mass index is 25.71 kg/m².    Intake/Output Summary (Last 24 hours) at 12/31/17 1616  Last data filed at 12/31/17 1434   Gross per 24 hour   Intake          4384.88 ml   Output              950 ml   Net          3434.88 ml      Physical Exam   Constitutional: He is oriented to person, place, and time. He appears well-developed and well-nourished. No distress.   HENT:   Head: Normocephalic and atraumatic.   Right Ear: External ear normal.   Left Ear: External ear normal.   Nose: Nose normal.   Mouth/Throat: Oropharynx is clear and moist. No oropharyngeal exudate.   Eyes: Conjunctivae and EOM are normal. Pupils are equal, round, and reactive to light. Right eye exhibits no discharge. Left eye exhibits no discharge. No scleral icterus.   Neck: Normal range of motion. Neck supple. No JVD present. No tracheal deviation present. No thyromegaly present.   Cardiovascular: Normal rate, regular rhythm, normal heart sounds and intact distal pulses.  Exam reveals no gallop and no friction rub.    No murmur heard.  Pulmonary/Chest: Effort normal. No stridor. No respiratory distress. He has no wheezes. He has rales. He exhibits no tenderness.   Non-productive cough   Abdominal: Soft. Bowel sounds are normal. He exhibits no distension and no mass. There is no tenderness. There is no rebound and no guarding. No hernia.   Musculoskeletal: Normal range of motion. He exhibits no edema, tenderness or deformity.   Lymphadenopathy:     He has no cervical adenopathy.   Neurological: He is alert and oriented to person, place, and time. He displays normal reflexes. No cranial nerve deficit or sensory deficit. He exhibits normal muscle tone. Coordination normal.   Skin: Skin is warm and dry. Capillary refill takes less than 2 seconds. No rash noted. He is not diaphoretic. No erythema. No pallor.   Psychiatric: He  has a normal mood and affect. His behavior is normal. Judgment and thought content normal.   Nursing note and vitals reviewed.      Significant Labs: BMP: No results for input(s): GLU, NA, K, CL, CO2, BUN, CREATININE, CALCIUM, MG in the last 48 hours.  CBC:   Recent Labs  Lab 12/30/17  0705  12/30/17  1738 12/31/17  0006 12/31/17  1213   WBC 20.68*  --   --   --  24.57*   HGB 9.0*  < > 8.8* 7.7* 8.1*   HCT 28.5*  < > 27.6* 24.9* 26.1*   *  434*  --   --   --  498*   < > = values in this interval not displayed.  All pertinent labs within the past 24 hours have been reviewed.    Significant Imaging: I have reviewed and interpreted all pertinent imaging results/findings within the past 24 hours.

## 2017-12-31 NOTE — PROGRESS NOTES
PTT 49.6, per heparin nomogram no change needed to heparin gtt. Daily PTT due.  Will continue to monitor.

## 2017-12-31 NOTE — PLAN OF CARE
Problem: Patient Care Overview  Goal: Plan of Care Review  Outcome: Ongoing (interventions implemented as appropriate)  Pt stable. Pt is NSR on the heart monitor.  Pt c/o left flank pain off and on.  Pt on heparin gtt at 17units/kg/hr, PTT therapeutic, daily ptt to monitor.  Pt received IV abx as ordered and NS at 125.  Blood sugar high, SSI changed to moderate coverage.  Plan of care reviewed.  Pt verbalizes understanding.  Pt was free from falls or injuries during duration of shift.  Pt turned and repositioned self with encouragement.  PIV intact with no redness, swelling or drainage.  Bed low, wheels locked, bed alarm on, call light in reach.  Pt instructed to call for assistance.  Will continue to monitor.

## 2017-12-31 NOTE — ASSESSMENT & PLAN NOTE
-Accuchecks and insulin sliding scale.   -Will reduce current home insulin to half dose and titrate up or down as needed    12/31-will continue sliding scale insulin ,controlled .

## 2017-12-31 NOTE — PLAN OF CARE
Problem: Patient Care Overview  Goal: Plan of Care Review  Outcome: Ongoing (interventions implemented as appropriate)  Tolerates txs well; no resp distress noted.

## 2017-12-31 NOTE — PROGRESS NOTES
[facility] Hospital Medicine  Progress Note    Patient Name: Uday Reyna  MRN: [id number]  Patient Class: IP- Inpatient   Admission Date: 12/29/2017  Length of Stay: 1 days  Attending Physician: Guanaco Arango MD  Primary Care Provider: Wallace Kennedy MD        Subjective:     Principal Problem:Acute hypoxemic respiratory failure    HPI:  Uday Reyna is a 77-year old gentleman presenting to [facility] ER following a recent diagnosis and treatment for influenza while hospitalized at [facility]. He was discharged and returned to the [facility] ER with complains of persistent non-productive cough and hypoxia noted by the home health aide. He also had a low grade fever. He was diagnosed by CTA chest with pulmonary embolism at [facility] and transferred for continued care as an inpatient.      Hospital Course:  12/31- 77 year old man admitted with pneumonia /acute PE.  WBC is 24 today-increased from 20 .    Interval History:   77 year old man with new acute PE and pneumonia . Aspiration pneumonia is suspected .   Review of Systems   Constitutional: Negative for activity change, appetite change, chills, diaphoresis, fatigue, fever and unexpected weight change.   HENT: Negative for dental problem, ear pain, facial swelling, mouth sores, postnasal drip, rhinorrhea, sinus pressure and sore throat.    Eyes: Negative for photophobia, pain, discharge, redness, itching and visual disturbance.   Respiratory: Positive for cough, chest tightness and shortness of breath. Negative for apnea, choking, wheezing and stridor.    Cardiovascular: Negative for chest pain, palpitations and leg swelling.   Gastrointestinal: Negative for abdominal distention, abdominal pain, anal bleeding, blood in stool, constipation, nausea, rectal pain and vomiting.   Endocrine: Negative for heat intolerance, polydipsia, polyphagia and polyuria.   Genitourinary: Negative for decreased urine volume, difficulty urinating, dysuria,  enuresis, flank pain, frequency and urgency.   Musculoskeletal: Negative for arthralgias, back pain, gait problem, joint swelling, myalgias, neck pain and neck stiffness.   Skin: Negative for color change, pallor, rash and wound.   Neurological: Negative for dizziness, tremors, seizures, syncope, facial asymmetry, weakness, light-headedness, numbness and headaches.   Hematological: Negative for adenopathy. Does not bruise/bleed easily.   Psychiatric/Behavioral: Negative for decreased concentration, dysphoric mood, hallucinations, self-injury, sleep disturbance and suicidal ideas. The patient is not nervous/anxious and is not hyperactive.      Objective:     Vital Signs (Most Recent):  Temp: 97.6 °F (36.4 °C) (12/31/17 1603)  Pulse: 70 (12/31/17 1603)  Resp: 20 (12/31/17 1603)  BP: 137/71 (12/31/17 1603)  SpO2: 95 % (12/31/17 1603) Vital Signs (24h Range):  Temp:  [97.6 °F (36.4 °C)-98.6 °F (37 °C)] 97.6 °F (36.4 °C)  Pulse:  [70-92] 70  Resp:  [16-20] 20  SpO2:  [93 %-98 %] 95 %  BP: (121-137)/(59-71) 137/71     Weight: 88.4 kg (194 lb 14.2 oz)  Body mass index is 25.71 kg/m².    Intake/Output Summary (Last 24 hours) at 12/31/17 1616  Last data filed at 12/31/17 1434   Gross per 24 hour   Intake          4384.88 ml   Output              950 ml   Net          3434.88 ml      Physical Exam   Constitutional: He is oriented to person, place, and time. He appears well-developed and well-nourished. No distress.   HENT:   Head: Normocephalic and atraumatic.   Right Ear: External ear normal.   Left Ear: External ear normal.   Nose: Nose normal.   Mouth/Throat: Oropharynx is clear and moist. No oropharyngeal exudate.   Eyes: Conjunctivae and EOM are normal. Pupils are equal, round, and reactive to light. Right eye exhibits no discharge. Left eye exhibits no discharge. No scleral icterus.   Neck: Normal range of motion. Neck supple. No JVD present. No tracheal deviation present. No thyromegaly present.   Cardiovascular:  Normal rate, regular rhythm, normal heart sounds and intact distal pulses.  Exam reveals no gallop and no friction rub.    No murmur heard.  Pulmonary/Chest: Effort normal. No stridor. No respiratory distress. He has no wheezes. He has rales. He exhibits no tenderness.   Non-productive cough   Abdominal: Soft. Bowel sounds are normal. He exhibits no distension and no mass. There is no tenderness. There is no rebound and no guarding. No hernia.   Musculoskeletal: Normal range of motion. He exhibits no edema, tenderness or deformity.   Lymphadenopathy:     He has no cervical adenopathy.   Neurological: He is alert and oriented to person, place, and time. He displays normal reflexes. No cranial nerve deficit or sensory deficit. He exhibits normal muscle tone. Coordination normal.   Skin: Skin is warm and dry. Capillary refill takes less than 2 seconds. No rash noted. He is not diaphoretic. No erythema. No pallor.   Psychiatric: He has a normal mood and affect. His behavior is normal. Judgment and thought content normal.   Nursing note and vitals reviewed.      Significant Labs: BMP: No results for input(s): GLU, NA, K, CL, CO2, BUN, CREATININE, CALCIUM, MG in the last 48 hours.  CBC:   Recent Labs  Lab 12/30/17  0705  12/30/17  1738 12/31/17  0006 12/31/17  1213   WBC 20.68*  --   --   --  24.57*   HGB 9.0*  < > 8.8* 7.7* 8.1*   HCT 28.5*  < > 27.6* 24.9* 26.1*   *  434*  --   --   --  498*   < > = values in this interval not displayed.  All pertinent labs within the past 24 hours have been reviewed.    Significant Imaging: I have reviewed and interpreted all pertinent imaging results/findings within the past 24 hours.    Assessment/Plan:      * Acute hypoxemic respiratory failure    -Treat underlying cause.   -Oxygen supplementation.     12/31-will continue heparin drip and present antimicrobial therapy -will do modified barium swallow , aspiration pneumonia is suspected          Type 2 diabetes mellitus  without complication    -Accuchecks and insulin sliding scale.   -Will reduce current home insulin to half dose and titrate up or down as needed    12/31-will continue sliding scale insulin ,controlled .          Acute pulmonary embolism    -IV heparin, for now. Oral anticoagulation later.  -Investigate potential sources. Hospitalization for 6 days at the [facility] only culpable factor immediately identifiable.     Total critical care time spent on the patient excluding any separately billable procedure is 37 minutes.       12/31- will continue heparin drip , will be started on eliquis in am         Pneumonia due to infectious organism    -IV antibiotics.   -Follow up sputum and blood cultures.      12/31-will do modified barium swallow and will need to rule out aspiration pneumonia   Continue vanco,zosyn and cipro for now    Check cbc in am   supportive care           VTE Risk Mitigation         Ordered     heparin 25,000 units in dextrose 5% 250 mL (100 units/mL) infusion; MALE  Continuous     Route:  Intravenous        12/30/17 0254     heparin 25,000 units in dextrose 5% 250 mL (100 units/mL) bolus from bag; PRN BOLUS  As needed (PRN)     Route:  Intravenous        12/30/17 0254     heparin 25,000 units in dextrose 5% 250 mL (100 units/mL) bolus from bag; PRN BOLUS  As needed (PRN)     Route:  Intravenous        12/30/17 0254     Medium Risk of VTE  Once      12/30/17 0240     Reason for No Pharmacological VTE Prophylaxis  Once      12/30/17 0240              Guanaco Arango MD  Department of Hospital Medicine   [facility]

## 2017-12-31 NOTE — PLAN OF CARE
Problem: Patient Care Overview  Goal: Plan of Care Review  Reviewed plan of care with patient and daughter at bedside, verbalized understanding. Heparin gtt 17units, infusing at 14.6 mL/hr, tolerating well. Normal Saline 125 ml/hr. No injuries, trauma, nor incidents this shift. Bed in lowest position,  Belongings at bedside, will continue  To monitor.

## 2017-12-31 NOTE — ASSESSMENT & PLAN NOTE
-Treat underlying cause.   -Oxygen supplementation.     12/31-will continue heparin drip and present antimicrobial therapy -will do modified barium swallow , aspiration pneumonia is suspected

## 2018-01-01 LAB
ANION GAP SERPL CALC-SCNC: 8 MMOL/L
APTT BLDCRRT: 35.3 SEC
BASOPHILS # BLD AUTO: 0.01 K/UL
BASOPHILS NFR BLD: 0 %
BUN SERPL-MCNC: 15 MG/DL
CALCIUM SERPL-MCNC: 8.2 MG/DL
CHLORIDE SERPL-SCNC: 98 MMOL/L
CO2 SERPL-SCNC: 26 MMOL/L
CREAT SERPL-MCNC: 0.9 MG/DL
DIFFERENTIAL METHOD: ABNORMAL
EOSINOPHIL # BLD AUTO: 0 K/UL
EOSINOPHIL NFR BLD: 0 %
ERYTHROCYTE [DISTWIDTH] IN BLOOD BY AUTOMATED COUNT: 12 %
EST. GFR  (AFRICAN AMERICAN): >60 ML/MIN/1.73 M^2
EST. GFR  (NON AFRICAN AMERICAN): >60 ML/MIN/1.73 M^2
FACT X PPP CHRO-ACNC: 0.21 IU/ML
GLUCOSE SERPL-MCNC: 278 MG/DL
HCT VFR BLD AUTO: 27.6 %
HGB BLD-MCNC: 8.4 G/DL
LYMPHOCYTES # BLD AUTO: 1 K/UL
LYMPHOCYTES NFR BLD: 4.2 %
MCH RBC QN AUTO: 27.3 PG
MCHC RBC AUTO-ENTMCNC: 30.4 G/DL
MCV RBC AUTO: 90 FL
MONOCYTES # BLD AUTO: 1.4 K/UL
MONOCYTES NFR BLD: 6.1 %
NEUTROPHILS # BLD AUTO: 21.1 K/UL
NEUTROPHILS NFR BLD: 89.7 %
PLATELET # BLD AUTO: 536 K/UL
PMV BLD AUTO: 10 FL
POCT GLUCOSE: 220 MG/DL (ref 70–110)
POCT GLUCOSE: 266 MG/DL (ref 70–110)
POCT GLUCOSE: 323 MG/DL (ref 70–110)
POCT GLUCOSE: 380 MG/DL (ref 70–110)
POTASSIUM SERPL-SCNC: 4.4 MMOL/L
RBC # BLD AUTO: 3.08 M/UL
SODIUM SERPL-SCNC: 132 MMOL/L
WBC # BLD AUTO: 23.55 K/UL

## 2018-01-01 PROCEDURE — 27000221 HC OXYGEN, UP TO 24 HOURS

## 2018-01-01 PROCEDURE — 25000003 PHARM REV CODE 250: Performed by: EMERGENCY MEDICINE

## 2018-01-01 PROCEDURE — 85025 COMPLETE CBC W/AUTO DIFF WBC: CPT

## 2018-01-01 PROCEDURE — 85520 HEPARIN ASSAY: CPT

## 2018-01-01 PROCEDURE — 80048 BASIC METABOLIC PNL TOTAL CA: CPT

## 2018-01-01 PROCEDURE — 25000003 PHARM REV CODE 250: Performed by: INTERNAL MEDICINE

## 2018-01-01 PROCEDURE — 63600175 PHARM REV CODE 636 W HCPCS: Performed by: EMERGENCY MEDICINE

## 2018-01-01 PROCEDURE — 21400001 HC TELEMETRY ROOM

## 2018-01-01 PROCEDURE — 25000242 PHARM REV CODE 250 ALT 637 W/ HCPCS: Performed by: EMERGENCY MEDICINE

## 2018-01-01 PROCEDURE — 36415 COLL VENOUS BLD VENIPUNCTURE: CPT

## 2018-01-01 PROCEDURE — 85730 THROMBOPLASTIN TIME PARTIAL: CPT

## 2018-01-01 PROCEDURE — 63600175 PHARM REV CODE 636 W HCPCS: Performed by: INTERNAL MEDICINE

## 2018-01-01 PROCEDURE — 94640 AIRWAY INHALATION TREATMENT: CPT

## 2018-01-01 RX ADMIN — SODIUM CHLORIDE: 0.9 INJECTION, SOLUTION INTRAVENOUS at 03:01

## 2018-01-01 RX ADMIN — INSULIN ASPART 4 UNITS: 100 INJECTION, SOLUTION INTRAVENOUS; SUBCUTANEOUS at 09:01

## 2018-01-01 RX ADMIN — METHYLPREDNISOLONE SODIUM SUCCINATE 80 MG: 125 INJECTION, POWDER, FOR SOLUTION INTRAMUSCULAR; INTRAVENOUS at 01:01

## 2018-01-01 RX ADMIN — SODIUM CHLORIDE: 0.9 INJECTION, SOLUTION INTRAVENOUS at 06:01

## 2018-01-01 RX ADMIN — ATORVASTATIN CALCIUM 40 MG: 40 TABLET, FILM COATED ORAL at 09:01

## 2018-01-01 RX ADMIN — CIPROFLOXACIN 400 MG: 2 INJECTION, SOLUTION INTRAVENOUS at 02:01

## 2018-01-01 RX ADMIN — LATANOPROST 1 DROP: 50 SOLUTION OPHTHALMIC at 09:01

## 2018-01-01 RX ADMIN — PIPERACILLIN AND TAZOBACTAM 4.5 G: 4; .5 INJECTION, POWDER, FOR SOLUTION INTRAVENOUS at 07:01

## 2018-01-01 RX ADMIN — INSULIN ASPART 10 UNITS: 100 INJECTION, SOLUTION INTRAVENOUS; SUBCUTANEOUS at 11:01

## 2018-01-01 RX ADMIN — DORZOLAMIDE HYDROCHLORIDE AND TIMOLOL MALEATE 1 DROP: 20; 5 SOLUTION/ DROPS OPHTHALMIC at 09:01

## 2018-01-01 RX ADMIN — IPRATROPIUM BROMIDE AND ALBUTEROL SULFATE 3 ML: .5; 3 SOLUTION RESPIRATORY (INHALATION) at 07:01

## 2018-01-01 RX ADMIN — PANTOPRAZOLE SODIUM 40 MG: 40 TABLET, DELAYED RELEASE ORAL at 09:01

## 2018-01-01 RX ADMIN — METHYLPREDNISOLONE SODIUM SUCCINATE 80 MG: 125 INJECTION, POWDER, FOR SOLUTION INTRAMUSCULAR; INTRAVENOUS at 06:01

## 2018-01-01 RX ADMIN — LISINOPRIL 10 MG: 10 TABLET ORAL at 09:01

## 2018-01-01 RX ADMIN — APIXABAN 10 MG: 2.5 TABLET, FILM COATED ORAL at 09:01

## 2018-01-01 RX ADMIN — INSULIN ASPART 4 UNITS: 100 INJECTION, SOLUTION INTRAVENOUS; SUBCUTANEOUS at 05:01

## 2018-01-01 RX ADMIN — PIPERACILLIN AND TAZOBACTAM 4.5 G: 4; .5 INJECTION, POWDER, FOR SOLUTION INTRAVENOUS at 03:01

## 2018-01-01 RX ADMIN — METHYLPREDNISOLONE SODIUM SUCCINATE 80 MG: 125 INJECTION, POWDER, FOR SOLUTION INTRAMUSCULAR; INTRAVENOUS at 09:01

## 2018-01-01 RX ADMIN — VANCOMYCIN HYDROCHLORIDE 1500 MG: 1 INJECTION, POWDER, LYOPHILIZED, FOR SOLUTION INTRAVENOUS at 06:01

## 2018-01-01 RX ADMIN — VANCOMYCIN HYDROCHLORIDE 1500 MG: 1 INJECTION, POWDER, LYOPHILIZED, FOR SOLUTION INTRAVENOUS at 05:01

## 2018-01-01 RX ADMIN — IPRATROPIUM BROMIDE AND ALBUTEROL SULFATE 3 ML: .5; 3 SOLUTION RESPIRATORY (INHALATION) at 12:01

## 2018-01-01 RX ADMIN — HEPARIN SODIUM AND DEXTROSE 17 UNITS/KG/HR: 10000; 5 INJECTION INTRAVENOUS at 02:01

## 2018-01-01 RX ADMIN — GUAIFENESIN 200 MG: 200 SOLUTION ORAL at 09:01

## 2018-01-01 RX ADMIN — CIPROFLOXACIN 400 MG: 2 INJECTION, SOLUTION INTRAVENOUS at 01:01

## 2018-01-01 RX ADMIN — INSULIN DETEMIR 20 UNITS: 100 INJECTION, SOLUTION SUBCUTANEOUS at 07:01

## 2018-01-01 RX ADMIN — INSULIN DETEMIR 25 UNITS: 100 INJECTION, SOLUTION SUBCUTANEOUS at 09:01

## 2018-01-01 RX ADMIN — TAMSULOSIN HYDROCHLORIDE 0.4 MG: 0.4 CAPSULE ORAL at 09:01

## 2018-01-01 NOTE — SUBJECTIVE & OBJECTIVE
Interval History: Improving steadily    Review of Systems   Constitutional: Positive for fatigue. Negative for appetite change and fever.   HENT: Negative.  Negative for congestion, sinus pressure and sore throat.    Eyes: Negative.  Negative for photophobia, discharge and visual disturbance.   Respiratory: Positive for shortness of breath and wheezing. Negative for cough and chest tightness.    Cardiovascular: Negative.  Negative for chest pain and palpitations.   Gastrointestinal: Negative.  Negative for abdominal pain, blood in stool, constipation, diarrhea, nausea and vomiting.   Endocrine: Negative.    Genitourinary: Negative.    Musculoskeletal: Negative.  Negative for myalgias, neck pain and neck stiffness.   Skin: Negative.    Allergic/Immunologic: Negative.    Neurological: Positive for weakness. Negative for seizures, syncope and headaches.   Hematological: Negative.    Psychiatric/Behavioral: Negative.  Negative for agitation, behavioral problems, hallucinations, self-injury and suicidal ideas.     Objective:     Vital Signs (Most Recent):  Temp: 98.1 °F (36.7 °C) (01/01/18 1611)  Pulse: 65 (01/01/18 1611)  Resp: 18 (01/01/18 1611)  BP: 135/63 (01/01/18 1611)  SpO2: (!) 94 % (01/01/18 1611) Vital Signs (24h Range):  Temp:  [97.6 °F (36.4 °C)-98.1 °F (36.7 °C)] 98.1 °F (36.7 °C)  Pulse:  [65-98] 65  Resp:  [18-20] 18  SpO2:  [92 %-100 %] 94 %  BP: (110-147)/(55-74) 135/63     Weight: 91.6 kg (201 lb 15.1 oz)  Body mass index is 26.64 kg/m².    Intake/Output Summary (Last 24 hours) at 01/01/18 1707  Last data filed at 01/01/18 1533   Gross per 24 hour   Intake           6197.4 ml   Output             3300 ml   Net           2897.4 ml      Physical Exam   Constitutional: He is oriented to person, place, and time. He appears well-developed and well-nourished.   ILL Appearing   HENT:   Head: Normocephalic and atraumatic.   Eyes: EOM are normal. Pupils are equal, round, and reactive to light.   Neck: Normal  range of motion. Neck supple.   Cardiovascular: Normal rate, regular rhythm and intact distal pulses.    Murmur heard.  Pulmonary/Chest: No respiratory distress. He has wheezes.   Abdominal: Soft. Bowel sounds are normal.   Musculoskeletal: Normal range of motion. He exhibits no deformity.   Neurological: He is alert and oriented to person, place, and time. He has normal reflexes.   Skin: Skin is warm and dry. Capillary refill takes less than 2 seconds.   Psychiatric: He has a normal mood and affect. His behavior is normal. Judgment and thought content normal.   Nursing note and vitals reviewed.      Significant Labs:   BMP:   Recent Labs  Lab 01/01/18  0534   *   *   K 4.4   CL 98   CO2 26   BUN 15   CREATININE 0.9   CALCIUM 8.2*     CBC:   Recent Labs  Lab 12/31/17  0006 12/31/17  1213 01/01/18  0534   WBC  --  24.57* 23.55*   HGB 7.7* 8.1* 8.4*   HCT 24.9* 26.1* 27.6*   PLT  --  498* 536*     CMP:   Recent Labs  Lab 01/01/18  0534   *   K 4.4   CL 98   CO2 26   *   BUN 15   CREATININE 0.9   CALCIUM 8.2*   ANIONGAP 8   EGFRNONAA >60     All pertinent labs within the past 24 hours have been reviewed.    Significant Imaging: I have reviewed all pertinent imaging results/findings within the past 24 hours.

## 2018-01-01 NOTE — PT/OT/SLP EVAL
Speech Language Pathology Evaluation  Bedside Swallow    Patient Name:  Uday Reyna   MRN:  [id number]  Admitting Diagnosis: Acute hypoxemic respiratory failure    Recommendations:                 General Recommendations:    Diet recommendations:  Regular, Thin   Aspiration Precautions:   General Precautions: Standard, aspiration  Communication strategies:  n/a    History:     Past Medical History:   Diagnosis Date    COPD (chronic obstructive pulmonary disease)     Diabetes mellitus     Hyperlipemia     Prostate atrophy        Past Surgical History:   Procedure Laterality Date    CARPAL TUNNEL RELEASE      WRIST FRACTURE SURGERY         Social History: Patient lives with his son and grandchild.    Prior Intubation HX:  n/a    Modified Barium Swallow: none reported per pt.    Chest X-Rays: PE,  Pneumonia    Prior diet: Regular    Occupation/hobbies/homemaking: n/a    Subjective     Pt. Admitted with low grade fever; SOB.    He reported that he was recently diagnosed with influenza and did have a short hospitalization at [facility].    Patient goals: get to feeling better    Objective:     Oral Musculature Evaluation  · Oral Musculature: WFL    Bedside Swallow Eval:   Consistencies Assessed:  · Thin, Thick, Pureed, Cracker.    Oral Phase:   · No difficulties noted    Pharyngeal Phase:   · Pt. Presented with no observable signs of swallowing difficulties with liquids, pureed or solids.  No coughing, wet vocal quality or swallow delays.    Pt. Has reported no swallowing difficulties     Compensatory Strategies  · Basic precautions recommended     Treatment: n/a    Assessment:     Uday Reyna is a 77 y.o. male with an SLP diagnosis of Dysphagia.   He presents with no observable signs of swallowing difficulties but M.D. Requested Modified Barium Swallow Study to further assess swallowing.   Basic swallow precautions reviewed with the patient.    Goals:    SLP Goals        Problem: SLP Goal    Goal Priority Disciplines  Outcome   SLP Goal     SLP    Description:  Long Term Goal:  Pt. To tolerate highest level diet consistency safely and efficiently.    Short Term Goals:  MBSS to further assess swallowing with goal as appropriate.                    Plan:     · Patient to be seen:      · Plan of Care expires:     · Plan of Care reviewed with:  patient   · SLP Follow-Up:  Yes       Discharge recommendations:      Barriers to Discharge:  none    Time Tracking:     SLP Treatment Date:   12/31/17  Speech Start Time:  1435  Speech Stop Time:  1510     Speech Total Time (min):  35 min    Billable Minutes: 35 minutes    Renata Magdaleno CCC-SLP  12/31/2017

## 2018-01-01 NOTE — ASSESSMENT & PLAN NOTE
-Treat underlying cause.   -Oxygen supplementation.     12/31-will continue heparin drip and present antimicrobial therapy -will do modified barium swallow , aspiration pneumonia is suspected    1/1 - Aspiration PNA tx with ABX. Nutrition evaluated and recommended Reg / Thin Liquids

## 2018-01-01 NOTE — PROGRESS NOTES
[facility] Hospital Medicine  Progress Note    Patient Name: Uday Reyna  MRN: [id number]  Patient Class: IP- Inpatient   Admission Date: 12/29/2017  Length of Stay: 2 days  Attending Physician: Derrell Manuel MD  Primary Care Provider: Wallace Kennedy MD        Subjective:     Principal Problem:Acute hypoxemic respiratory failure    HPI:  Uday Reyna is a 77-year old gentleman presenting to [facility] ER following a recent diagnosis and treatment for influenza while hospitalized at [facility]. He was discharged and returned to the [facility] ER with complains of persistent non-productive cough and hypoxia noted by the home health aide. He also had a low grade fever. He was diagnosed by CTA chest with pulmonary embolism at [facility] and transferred for continued care as an inpatient.      Hospital Course:  12/31- 77 year old man admitted with pneumonia /acute PE.  WBC is 24 today-increased from 20 .    1/1 - WBC remains elevated at 23.55. Patient clinically improving. I had a lengthy discussion with the patient and family. The patient will need and has agreed to SNF placement. Plan for eliquis for PE    Interval History: Improving steadily    Review of Systems   Constitutional: Positive for fatigue. Negative for appetite change and fever.   HENT: Negative.  Negative for congestion, sinus pressure and sore throat.    Eyes: Negative.  Negative for photophobia, discharge and visual disturbance.   Respiratory: Positive for shortness of breath and wheezing. Negative for cough and chest tightness.    Cardiovascular: Negative.  Negative for chest pain and palpitations.   Gastrointestinal: Negative.  Negative for abdominal pain, blood in stool, constipation, diarrhea, nausea and vomiting.   Endocrine: Negative.    Genitourinary: Negative.    Musculoskeletal: Negative.  Negative for myalgias, neck pain and neck stiffness.   Skin: Negative.    Allergic/Immunologic: Negative.    Neurological:  Positive for weakness. Negative for seizures, syncope and headaches.   Hematological: Negative.    Psychiatric/Behavioral: Negative.  Negative for agitation, behavioral problems, hallucinations, self-injury and suicidal ideas.     Objective:     Vital Signs (Most Recent):  Temp: 98.1 °F (36.7 °C) (01/01/18 1611)  Pulse: 65 (01/01/18 1611)  Resp: 18 (01/01/18 1611)  BP: 135/63 (01/01/18 1611)  SpO2: (!) 94 % (01/01/18 1611) Vital Signs (24h Range):  Temp:  [97.6 °F (36.4 °C)-98.1 °F (36.7 °C)] 98.1 °F (36.7 °C)  Pulse:  [65-98] 65  Resp:  [18-20] 18  SpO2:  [92 %-100 %] 94 %  BP: (110-147)/(55-74) 135/63     Weight: 91.6 kg (201 lb 15.1 oz)  Body mass index is 26.64 kg/m².    Intake/Output Summary (Last 24 hours) at 01/01/18 1707  Last data filed at 01/01/18 1533   Gross per 24 hour   Intake           6197.4 ml   Output             3300 ml   Net           2897.4 ml      Physical Exam   Constitutional: He is oriented to person, place, and time. He appears well-developed and well-nourished.   ILL Appearing   HENT:   Head: Normocephalic and atraumatic.   Eyes: EOM are normal. Pupils are equal, round, and reactive to light.   Neck: Normal range of motion. Neck supple.   Cardiovascular: Normal rate, regular rhythm and intact distal pulses.    Murmur heard.  Pulmonary/Chest: No respiratory distress. He has wheezes.   Abdominal: Soft. Bowel sounds are normal.   Musculoskeletal: Normal range of motion. He exhibits no deformity.   Neurological: He is alert and oriented to person, place, and time. He has normal reflexes.   Skin: Skin is warm and dry. Capillary refill takes less than 2 seconds.   Psychiatric: He has a normal mood and affect. His behavior is normal. Judgment and thought content normal.   Nursing note and vitals reviewed.      Significant Labs:   BMP:   Recent Labs  Lab 01/01/18  0534   *   *   K 4.4   CL 98   CO2 26   BUN 15   CREATININE 0.9   CALCIUM 8.2*     CBC:   Recent Labs  Lab 12/31/17  0005  12/31/17  1213 01/01/18  0534   WBC  --  24.57* 23.55*   HGB 7.7* 8.1* 8.4*   HCT 24.9* 26.1* 27.6*   PLT  --  498* 536*     CMP:   Recent Labs  Lab 01/01/18  0534   *   K 4.4   CL 98   CO2 26   *   BUN 15   CREATININE 0.9   CALCIUM 8.2*   ANIONGAP 8   EGFRNONAA >60     All pertinent labs within the past 24 hours have been reviewed.    Significant Imaging: I have reviewed all pertinent imaging results/findings within the past 24 hours.    Assessment/Plan:      * Acute hypoxemic respiratory failure    -Treat underlying cause.   -Oxygen supplementation.     12/31-will continue heparin drip and present antimicrobial therapy -will do modified barium swallow , aspiration pneumonia is suspected    1/1 - Aspiration PNA tx with ABX. Nutrition evaluated and recommended Reg / Thin Liquids          Type 2 diabetes mellitus without complication    -Accuchecks and insulin sliding scale.   -Will reduce current home insulin to half dose and titrate up or down as needed    12/31-will continue sliding scale insulin ,controlled     1/1 - .Levemir 25 BID   - NISS  - Accuchecks          Acute pulmonary embolism    -IV heparin, for now. Oral anticoagulation later.  -Investigate potential sources. Hospitalization for 6 days at the [facility] only culpable factor immediately identifiable.     Total critical care time spent on the patient excluding any separately billable procedure is 37 minutes.       12/31- will continue heparin drip , will be started on eliquis in am     1/1 - Eliquis. D/C Heparin Gtt        Pneumonia due to infectious organism    -IV antibiotics.   -Follow up sputum and blood cultures.      12/31-will do modified barium swallow and will need to rule out aspiration pneumonia   Continue vanco,zosyn and cipro for now    Check cbc in am   supportive care     1/1 - ABX  - ID on Consult          VTE Risk Mitigation         Ordered     apixaban tablet 10 mg  2 times daily     Route:  Oral        01/01/18 0714      Medium Risk of VTE  Once      12/30/17 0240     Reason for No Pharmacological VTE Prophylaxis  Once      12/30/17 0240              Derrell Manuel MD  Department of Hospital Medicine   [facility]

## 2018-01-01 NOTE — ASSESSMENT & PLAN NOTE
-IV heparin, for now. Oral anticoagulation later.  -Investigate potential sources. Hospitalization for 6 days at the [facility] only culpable factor immediately identifiable.     Total critical care time spent on the patient excluding any separately billable procedure is 37 minutes.       12/31- will continue heparin drip , will be started on eliquis in am     1/1 - Eliquis. D/C Heparin Gtt

## 2018-01-01 NOTE — PLAN OF CARE
Problem: Patient Care Overview  Goal: Plan of Care Review  Outcome: Ongoing (interventions implemented as appropriate)  Pt stable. Pt is NSR on the heart monitor.  Pt had no complaints of pain.  Blood sugar remained elevated needing high SSI coverage, long acting insulin increased as well.  Pt received scheduled IV abx.  IVF infusing at 125ml/hr.  Pt encouraged to sit up on the side of the bed with each meal.   Plan of care reviewed.  Pt verbalizes understanding.  Pt was free from falls or injuries during duration of shift.  Pt turned and repositioned self.  PIV intact with no redness, swelling or drainage.  Bed low, wheels locked, bed alarm on, call light in reach.  Pt instructed to call for assistance.  Will continue to monitor.

## 2018-01-01 NOTE — ASSESSMENT & PLAN NOTE
-IV antibiotics.   -Follow up sputum and blood cultures.      12/31-will do modified barium swallow and will need to rule out aspiration pneumonia   Continue vanco,zosyn and cipro for now    Check cbc in am   supportive care     1/1 - ABX  - ID on Consult

## 2018-01-01 NOTE — PLAN OF CARE
Problem: SLP Goal  Goal: SLP Goal  Long Term Goal:  Pt. To tolerate highest level diet consistency safely and efficiently.    Short Term Goals:  MBSS to further assess swallowing with goal as appropriate.    Pt. To have MBSS to further assess swallowing.

## 2018-01-01 NOTE — ASSESSMENT & PLAN NOTE
-Accuchecks and insulin sliding scale.   -Will reduce current home insulin to half dose and titrate up or down as needed    12/31-will continue sliding scale insulin ,controlled     1/1 - .Levemir 25 BID   - NISS  - Accuchecks

## 2018-01-01 NOTE — PROGRESS NOTES
Night nurse aware of late zosyn d/t vanc infusing now.  Night nurse okay with hanging zosyn when vanc finishes.

## 2018-01-01 NOTE — PLAN OF CARE
Problem: Fall Risk (Adult)  Goal: Absence of Falls  Patient will demonstrate the desired outcomes by discharge/transition of care.   Outcome: Ongoing (interventions implemented as appropriate)  Plan of care reviewed patient, verbalized understanding. IV antibiotics given as ordered, tolerated well. NS infusing at 125 ml/hr, tolerating well. No falls, injuries this shift. Patient pulled IV out accidentally, removed and replaced. No trauma noted. Belongings at bedside, bed in lowest position, call light within reach. Will continue to monitor.

## 2018-01-02 VITALS
RESPIRATION RATE: 18 BRPM | HEART RATE: 89 BPM | OXYGEN SATURATION: 93 % | TEMPERATURE: 98 F | BODY MASS INDEX: 26.53 KG/M2 | SYSTOLIC BLOOD PRESSURE: 151 MMHG | WEIGHT: 200.19 LBS | DIASTOLIC BLOOD PRESSURE: 77 MMHG | HEIGHT: 73 IN

## 2018-01-02 PROBLEM — J96.01 ACUTE HYPOXEMIC RESPIRATORY FAILURE: Status: RESOLVED | Noted: 2017-12-30 | Resolved: 2018-01-02

## 2018-01-02 PROBLEM — R79.89 ELEVATED TROPONIN: Status: ACTIVE | Noted: 2018-01-02

## 2018-01-02 PROBLEM — J18.9 PNEUMONIA DUE TO INFECTIOUS ORGANISM: Status: RESOLVED | Noted: 2017-12-29 | Resolved: 2018-01-02

## 2018-01-02 PROBLEM — R79.89 ELEVATED TROPONIN: Status: RESOLVED | Noted: 2018-01-02 | Resolved: 2018-01-02

## 2018-01-02 LAB
POCT GLUCOSE: 231 MG/DL (ref 70–110)
POCT GLUCOSE: 334 MG/DL (ref 70–110)
VANCOMYCIN TROUGH SERPL-MCNC: 11.5 UG/ML

## 2018-01-02 PROCEDURE — G8988 SELF CARE GOAL STATUS: HCPCS | Mod: CH

## 2018-01-02 PROCEDURE — 63600175 PHARM REV CODE 636 W HCPCS: Performed by: INTERNAL MEDICINE

## 2018-01-02 PROCEDURE — 97161 PT EVAL LOW COMPLEX 20 MIN: CPT

## 2018-01-02 PROCEDURE — 63600175 PHARM REV CODE 636 W HCPCS: Performed by: EMERGENCY MEDICINE

## 2018-01-02 PROCEDURE — 94761 N-INVAS EAR/PLS OXIMETRY MLT: CPT

## 2018-01-02 PROCEDURE — G8989 SELF CARE D/C STATUS: HCPCS | Mod: CH

## 2018-01-02 PROCEDURE — 97530 THERAPEUTIC ACTIVITIES: CPT

## 2018-01-02 PROCEDURE — G8978 MOBILITY CURRENT STATUS: HCPCS | Mod: CH

## 2018-01-02 PROCEDURE — 27000221 HC OXYGEN, UP TO 24 HOURS

## 2018-01-02 PROCEDURE — 25000242 PHARM REV CODE 250 ALT 637 W/ HCPCS: Performed by: EMERGENCY MEDICINE

## 2018-01-02 PROCEDURE — 36415 COLL VENOUS BLD VENIPUNCTURE: CPT

## 2018-01-02 PROCEDURE — 25000003 PHARM REV CODE 250: Performed by: INTERNAL MEDICINE

## 2018-01-02 PROCEDURE — G8979 MOBILITY GOAL STATUS: HCPCS | Mod: CH

## 2018-01-02 PROCEDURE — 92611 MOTION FLUOROSCOPY/SWALLOW: CPT

## 2018-01-02 PROCEDURE — G8987 SELF CARE CURRENT STATUS: HCPCS | Mod: CH

## 2018-01-02 PROCEDURE — G8980 MOBILITY D/C STATUS: HCPCS | Mod: CH

## 2018-01-02 PROCEDURE — 97116 GAIT TRAINING THERAPY: CPT

## 2018-01-02 PROCEDURE — 25000003 PHARM REV CODE 250: Performed by: EMERGENCY MEDICINE

## 2018-01-02 PROCEDURE — 94640 AIRWAY INHALATION TREATMENT: CPT

## 2018-01-02 PROCEDURE — 80202 ASSAY OF VANCOMYCIN: CPT

## 2018-01-02 PROCEDURE — 97165 OT EVAL LOW COMPLEX 30 MIN: CPT

## 2018-01-02 RX ORDER — LEVOFLOXACIN 500 MG/1
500 TABLET, FILM COATED ORAL DAILY
Qty: 10 TABLET | Refills: 0 | Status: SHIPPED | OUTPATIENT
Start: 2018-01-02 | End: 2023-10-06

## 2018-01-02 RX ORDER — GUAIFENESIN 100 MG/5ML
200 SOLUTION ORAL EVERY 4 HOURS PRN
Refills: 0 | COMMUNITY
Start: 2018-01-02 | End: 2018-01-12

## 2018-01-02 RX ADMIN — IPRATROPIUM BROMIDE AND ALBUTEROL SULFATE 3 ML: .5; 3 SOLUTION RESPIRATORY (INHALATION) at 12:01

## 2018-01-02 RX ADMIN — IPRATROPIUM BROMIDE AND ALBUTEROL SULFATE 3 ML: .5; 3 SOLUTION RESPIRATORY (INHALATION) at 02:01

## 2018-01-02 RX ADMIN — APIXABAN 10 MG: 2.5 TABLET, FILM COATED ORAL at 08:01

## 2018-01-02 RX ADMIN — PIPERACILLIN AND TAZOBACTAM 4.5 G: 4; .5 INJECTION, POWDER, FOR SOLUTION INTRAVENOUS at 08:01

## 2018-01-02 RX ADMIN — INSULIN DETEMIR 25 UNITS: 100 INJECTION, SOLUTION SUBCUTANEOUS at 08:01

## 2018-01-02 RX ADMIN — SODIUM CHLORIDE: 0.9 INJECTION, SOLUTION INTRAVENOUS at 12:01

## 2018-01-02 RX ADMIN — PANTOPRAZOLE SODIUM 40 MG: 40 TABLET, DELAYED RELEASE ORAL at 08:01

## 2018-01-02 RX ADMIN — TAMSULOSIN HYDROCHLORIDE 0.4 MG: 0.4 CAPSULE ORAL at 08:01

## 2018-01-02 RX ADMIN — DORZOLAMIDE HYDROCHLORIDE AND TIMOLOL MALEATE 1 DROP: 20; 5 SOLUTION/ DROPS OPHTHALMIC at 08:01

## 2018-01-02 RX ADMIN — PIPERACILLIN AND TAZOBACTAM 4.5 G: 4; .5 INJECTION, POWDER, FOR SOLUTION INTRAVENOUS at 12:01

## 2018-01-02 RX ADMIN — CIPROFLOXACIN 400 MG: 2 INJECTION, SOLUTION INTRAVENOUS at 02:01

## 2018-01-02 RX ADMIN — ATORVASTATIN CALCIUM 40 MG: 40 TABLET, FILM COATED ORAL at 08:01

## 2018-01-02 RX ADMIN — IPRATROPIUM BROMIDE AND ALBUTEROL SULFATE 3 ML: .5; 3 SOLUTION RESPIRATORY (INHALATION) at 08:01

## 2018-01-02 RX ADMIN — METHYLPREDNISOLONE SODIUM SUCCINATE 80 MG: 125 INJECTION, POWDER, FOR SOLUTION INTRAMUSCULAR; INTRAVENOUS at 01:01

## 2018-01-02 RX ADMIN — METHYLPREDNISOLONE SODIUM SUCCINATE 80 MG: 125 INJECTION, POWDER, FOR SOLUTION INTRAMUSCULAR; INTRAVENOUS at 05:01

## 2018-01-02 RX ADMIN — CIPROFLOXACIN 400 MG: 2 INJECTION, SOLUTION INTRAVENOUS at 01:01

## 2018-01-02 RX ADMIN — INSULIN ASPART 4 UNITS: 100 INJECTION, SOLUTION INTRAVENOUS; SUBCUTANEOUS at 06:01

## 2018-01-02 RX ADMIN — LISINOPRIL 10 MG: 10 TABLET ORAL at 08:01

## 2018-01-02 RX ADMIN — VANCOMYCIN HYDROCHLORIDE 1500 MG: 1 INJECTION, POWDER, LYOPHILIZED, FOR SOLUTION INTRAVENOUS at 08:01

## 2018-01-02 NOTE — PLAN OF CARE
Hospitalist indicates that patient is medically stable to discharge and may benefit from SNF placement.  Reviewed documentation in Epic and noted that recommendation is for patient to follow-up with ST services post-hospitalization.  Requested that PT and OT evaluate patient as well, due to two disciplines being required for SNF placement.    Followed up with PT/OT, who both indicate that patient has no post-hospitalization needs from a PT/OT perspective and that patient has no HME needs as well.  (When meeting with patient's daughter earlier this morning, daughter stating that should patient meet criteria for SNF placement, [facility] and [facility] Form signed reflecting this).      Hospitalist later met with patient and daughter and communicated that patient does not meet SNF criteria.  Daughter indicates, and patient receptive to, being discharged home with resumption of  services via [facility]. This  spoke with daughter about this and Patient Preference Form signed reflecting HH preference as [place]; and signed form placed in patient's blue folder.  Daughter also requesting that Home 02 Evaluation be conducted.  Obtained order for Home 02 Eval and noted that patient does not meet criteria for Home 02.  Daughter reports having no other needs for patient.    Faxed, via [facility],  order and patient information to [facility] to complete referral.       01/02/18 5491   Final Note   Assessment Type Final Discharge Note   Discharge Disposition Home-Health  ([facility] SN, PT, OT, ST)   What phone number can be called within the next 1-3 days to see how you are doing after discharge? [phone]   Right [facility] Referral Info   Post Acute Recommendation Home-care   Referral Type  SN, PT, OT, ST   Facility Name [place], [place]

## 2018-01-02 NOTE — PT/OT/SLP PROGRESS
Physical Therapy                  Evaluation  Uday Reyna   MRN: [id number]   Admitting Diagnosis: Acute hypoxemic respiratory failure    PT Received On: 01/02/18  PT Start Time: 1225     PT Stop Time: 1250    PT Total Time (min): 25 min       Billable Minutes:  Evaluation 15 and Gait Training 10       PT/PTA: PT             General Precautions: Standard,    Orthopedic Precautions: N/A   Braces: N/A     PT WAS IND AT HOME AND GOES TO THE GYM REGULARLY. PT LIVES WITH DAUGHTER AND DRIVES.   NO P.T. IS REC AT THIS TIME. PT WILL BE D/C TO MOBILITY PROGRAM FOR MAINTENANCE.    Subjective:  Communicated with NURSE HICKMAN AND EPIC CHART REVIEW  prior to session.  PT AGREED TO EVAL AND TX     Pain/Comfort  Pain Rating 1: 0/10  Pain Rating Post-Intervention 1: 0/10    Objective:   Patient found with: peripheral IV, telemetry, oxygen    Functional Mobility:  PT SIT>STAND IND AND GT TRAINED X 200' WITH NO AD IND. PT STARTED JOGGING AT END OF HALLWAY. PT RETURNED TO  T/F TO CHAIR AND LEFT SEATED IN CHAIR WITH ALL NEEDS MET. PT EDUCATED ON ROLE OF MOBILITY PROGRAM    AM-PAC 6 CLICK MOBILITY  How much help from another person does this patient currently need?   1 = Unable, Total/Dependent Assistance  2 = A lot, Maximum/Moderate Assistance  3 = A little, Minimum/Contact Guard/Supervision  4 = None, Modified [place]/Independent    Turning over in bed (including adjusting bedclothes, sheets and blankets)?: 4  Sitting down on and standing up from a chair with arms (e.g., wheelchair, bedside commode, etc.): 4  Moving from lying on back to sitting on the side of the bed?: 4  Moving to and from a bed to a chair (including a wheelchair)?: 4  Need to walk in hospital room?: 4  Climbing 3-5 steps with a railing?: 1  Total Score: 21    AM-PAC Raw Score CMS G-Code Modifier Level of Impairment Assistance   6 % Total / Unable   7 - 9 CM 80 - 100% Maximal Assist   10 - 14 CL 60 - 80% Moderate Assist   15 - 19 CK 40 - 60% Moderate  Assist   20 - 22 CJ 20 - 40% Minimal Assist   23 CI 1-20% SBA / CGA   24 CH 0% Independent/ Mod I     Patient left up in chair with call button in reach and FAMILY present.    Assessment:  Uday Reyna is a 77 y.o. male with a medical diagnosis of Acute hypoxemic respiratory failure and presents with NO GROSS FUNC . PT WILL BE D/C TO MOBILITY PROGRAM FOR MAINTENANCE.         Discharge recommendations: Discharge Facility/Level Of Care Needs: home     Equipment recommendations: Equipment Needed After Discharge: none     PLAN:    D/C HOME WITH FAMILY  Plan of Care reviewed with: patient, daughter    PT G-Codes  Functional Assessment Tool Used: [place]  PAC  Score: CH  Functional Limitation: Mobility: Walking and moving around  Mobility: Walking and Moving Around Current Status (): CH  Mobility: Walking and Moving Around Goal Status (): CH  Mobility: Walking and Moving Around Discharge Status (): PUSHPA Mello, PT  01/02/2018

## 2018-01-02 NOTE — PLAN OF CARE
Problem: Patient Care Overview  Goal: Plan of Care Review  PT IS IND WITH ALL GROSS FUNC MOBILITY AND WILL BE D/C FROM P.T. TO MOBILITY PROGRAM   Outcome: Ongoing (interventions implemented as appropriate)  Plan of care reviewed with patient. Patient stable. Aaox3. Patient free from falls fall precautions in place. Assist x 1 to the bathroom. Call be;; and belongings with in reach reminded to call for assistance. Cont fluids. Blood glucose monitoring. Turn independently in bed.  IV antibiotics. Will cont to monitor

## 2018-01-02 NOTE — NURSING
Patient assessed for diabetes educational needs following chart review  Family at bedside for info  He was diagnosed over 20 yeas ago and has attended diabetes education class in the past  He has a home glucose monitor and checks 3 times daily  Prior to illness, his glucose average was 140-150  He is consistent with administering his insulin using the insulin pen  Has good recall on teach back but reinforced info on target glucose values/hyper/hypoglycemia  Verbalization of understanding

## 2018-01-02 NOTE — PROCEDURES
Modified Barium Swallow    Patient Name:  Uday Reyna   MRN:  [id number]      Recommendations:     Recommendations:                General Recommendations:  Follow-up not indicated  Diet recommendations:  Regular, Thin   Aspiration Precautions: 1 bite/sip at a time, HOB to 90 degrees and Small bites/sips   General Precautions: Standard, aspiration  Communication strategies:  none    Referral     Reason for Referral  Patient was referred for a Modified Barium Swallow Study to assess the efficiency of his/her swallow function, rule out aspiration and make recommendations regarding safe dietary consistencies, effective compensatory strategies, and safe eating environment.     Diagnosis: Acute hypoxemic respiratory failure       History:     Past Medical History:   Diagnosis Date    COPD (chronic obstructive pulmonary disease)     Diabetes mellitus     Hyperlipemia     Prostate atrophy        Objective:     Current Respiratory Status: 01/02/18    Alert: yes    Cooperative: yes    Follows Directions: yes    Visualization  · Patient was seen in the lateral view    Oral Peripheral Examination  · Oral Musculature: WFL    Consistencies Assessed  · Thin via straw  · Puree via spoon  · Soft solids bites of cracker    Oral Preparation/Oral Phase  · WFL- Pt with adequate bolus acceptance, containment, control and timely A-P transfer across consistencies     Pharyngeal Phase   Mildly decreased laryngeal elevation, no penetration, aspiration or residue was visualized    Cervical Esophageal Phase  · UES appeared to accommodate all bolus types without stasis or retrograde movement observed Esophageal stasis noted.    Assessment:     Impressions  ·  Pt presents with swallow that appears WFL at this time when taking average sized bites and sips.    Prognosis: n/a    Barriers:  · Taking large bites/sips, impulsivity  Plan    Education  Results were discussed with patient.    Goals:    SLP Goals        Problem: SLP Goal    Goal  Priority Disciplines Outcome   SLP Goal     SLP    Description:  Long Term Goal:  Pt. To tolerate highest level diet consistency safely and efficiently.    Short Term Goals:  MBSS to further assess swallowing with goal as appropriate.           Problem: SLP Goal    Goal Priority Disciplines Outcome   SLP Goal     SLP                    Plan:   · Patient to be seen:      · Plan of Care expires:     · Plan of Care reviewed with:  patient        Discharge recommendations:      Barriers to Discharge:  None    Time Tracking:   SLP Treatment Date:   01/02/18  Speech Start Time:  1025  Speech Stop Time:  1049     Speech Total Time (min):  24 min    Martine Hassan CCC-SLP  01/02/2018

## 2018-01-02 NOTE — PT/OT/SLP EVAL
Occupational Therapy   Evaluation and Discharge Note    Name: Uday Reyna  MRN: [id number]  Admitting Diagnosis:  Acute hypoxemic respiratory failure      Recommendations:     Discharge Recommendations: home  Discharge Equipment Recommendations:  none  Barriers to discharge:       History:     Occupational Profile:  Living Environment: LIVES WITH DCAUGHTER  Previous level of function: (I) WITH ADL'S, FUNCTIONAL MOBILITY, T/F'S AND WFL OF B UE ROM/STRENGTH/ENDURANCERoles and Routines: OCCUPATIONAL THERAPY  Equipment Owned:  none  Assistance upon Discharge:     Past Medical History:   Diagnosis Date    COPD (chronic obstructive pulmonary disease)     Diabetes mellitus     Hyperlipemia     Prostate atrophy        Past Surgical History:   Procedure Laterality Date    CARPAL TUNNEL RELEASE      WRIST FRACTURE SURGERY         Subjective     Chief Complaint:   Patient/Family stated goals:   Communicated with: DR RODRIGES AND [facility] CHART REVIEW prior to session.  Pain/Comfort:  · Pain Rating 1: 0/10    Objective:     Patient found with: telemetry, oxygen (2 LITERS)    General Precautions: Standard, aspiration   Orthopedic Precautions:    Braces: N/A     Occupational Performance:    Bed Mobility:    NA    Functional Mobility/Transfers:  · Patient completed Sit <> Stand Transfer with independence  with  no assistive device   · Patient completed Bed <> Chair Transfer using Stand Pivot technique with independence with no assistive device    Activities of Daily Living:  · UB Dressing: independence PER SELF  · LB Dressing: independence PER SELF    Cognitive/Visual Perceptual:  Cognitive/Psychosocial Skills:     -       Oriented to: Person, Place, Time and Situation   -       Follows Commands/attention:Follows multistep  commands  -       Communication: clear/fluent  -       Memory: No Deficits noted  Visual/Perceptual:      -Intact    Physical Exam:  Dominant hand:    -       RIGHT  Upper Extremity Range of Motion:     -       " Right Upper Extremity: WFL  -       Left Upper Extremity: WFL  Upper Extremity Strength:    -       Right Upper Extremity: WFL  -       Left Upper Extremity: WFL   Strength:    -       Right Upper Extremity: WFL  -       Left Upper Extremity: WFL    Patient left up in chair with all lines intact, DR. RODRIGES notified and DAUGHTER present    [facility] 6 Click:  [facility] Total Score:      Treatment & Education:    Education:    Assessment:     Uday Reyna is a 77 y.o. male with a medical diagnosis of Acute hypoxemic respiratory failure. At this time, patient is functioning at their prior level of function and does not require further acute OT services. PT PLACED ON PEOPLE 'S PROGRAM    Clinical Decision Makin.  OT Low:  "Pt evaluation falls under low complexity for evaluation coding due to performance deficits noted in 1-3 areas as stated above and 0 co-morbities affecting current functional status. Data obtained from problem focused assessments. No modifications or assistance was required for completion of evaluation. Only brief occupational profile and history review completed."     Plan:     During this hospitalization, patient does not require further acute OT services.  Please re-consult if situation changes.    · Plan of Care Reviewed with:      This Plan of care has been discussed with the patient who was involved in its development and understands and is in agreement with the identified goals and treatment plan    GOALS:    Occupational Therapy Goals        Problem: Occupational Therapy Goal    Goal Priority Disciplines Outcome Interventions   Occupational Therapy Goal     OT, PT/OT                     Time Tracking:     OT Date of Treatment: 18  OT Start Time: 1150  OT Stop Time: 1214  OT Total Time (min): 24 min    Billable Minutes:Evaluation 10 MINUTES  Therapeutic Activity 14 MINUTES    Charleen Del Cid OT  2018    "

## 2018-01-02 NOTE — PROGRESS NOTES
Clinical Pharmacy: Vancomycin Progress Note    Vancomycin trough: 11.5 mcg/ml (drawn 3 hours late)     Labs from 01/01/18  WBC = 23.55 (down from 24.57)  Tmax = 98.4  SCr = 0.8 (down from 0.9)     Blood Cx x 2 sets: NGTD x 4 days  Dx: Pneumonia      Goal trough: 15-20 mcg/ml     Vancomycin trough is falsely low due to being drawn 3 hours late. Will continue patient on current dosing regimen of Vancomycin 1500mg every 12 hours.   Next trough due: 01/03 at 1930    Thank you for allowing us to participate in this patient's care.   Kemi Elena, PharmD 1/2/2018 9:56 AM

## 2018-01-02 NOTE — PROGRESS NOTES
Home Oxygen Evaluation    Date Performed: 2018    1) Patient's Home O2 Sat on room air, while at rest: 93        If O2 sats on room air at rest are 88% or below, patient qualifies. No additional testing needed. Document N/A in steps 2 and 3. If 89% or above, complete steps 2.      2) Patient's O2 Sat on room air while exercisin        If O2 sats on room air while exercising remain 89% or above patient does not qualify, no further testing needed Document N/A in step 3. If O2 sats on room air while exercising are 88% or below, continue to step 3.      3) Patient's O2 Sat while exercising on O2: na at na LPM         (Must show improvement from #2 for patients to qualify)    If O2 sats improve on oxygen, patient qualifies for portable oxygen. If not, the patient does not qualify.

## 2018-01-02 NOTE — PLAN OF CARE
Problem: Patient Care Overview  Goal: Plan of Care Review  Outcome: Ongoing (interventions implemented as appropriate)  Patient doing well this shift. VSS, sinus rhythm. IV antibiotics administered as ordered. No complaints of pain. Blood glucose monitored. Will continue to monitor.

## 2018-01-03 LAB
BACTERIA BLD CULT: NORMAL
BACTERIA BLD CULT: NORMAL

## 2018-01-03 NOTE — DISCHARGE SUMMARY
[facility] Hospital Medicine  Discharge Summary      Patient Name: Uday Reyna  MRN: [id number]  Admission Date: 12/29/2017  Hospital Length of Stay: 3 days  Discharge Date and Time: No discharge date for patient encounter.  Attending Physician: Derrell Manuel MD   Discharging Provider: Derrell Manuel MD  Primary Care Provider: Wallace Kennedy MD      HPI:   Uday Reyna is a 77-year old gentleman presenting to [facility] ER following a recent diagnosis and treatment for influenza while hospitalized at [facility]. He was discharged and returned to the [facility] ER with complains of persistent non-productive cough and hypoxia noted by the home health aide. He also had a low grade fever. He was diagnosed by CTA chest with pulmonary embolism at [facility] and transferred for continued care as an inpatient.      * No surgery found *      Hospital Course:   12/31- 77 year old man admitted with pneumonia /acute PE.  WBC is 24 today-increased from 20 .    1/1 - WBC remains elevated at 23.55. Patient clinically improving. I had a lengthy discussion with the patient and family. The patient will need and has agreed to SNF placement. Plan for eliquis for PE    1/2- Patient 78 yo male admitted to hospital with SOB, determined to have an acute PE. Patient additionally with a pneumonia and initiated on antibiotics. Patient placed on Eliquis for his PE. Patient evaluated by PT / OT with recommendations for home with home health support. Patient evaluated for home O2 needs and patient did not require HH. Patient was further seen by speech therapy and evaluated with barium swallow  With no significant findings. Patient seen and examined today prior to his discharge and deemed stable for discharge to home .     Consults:   Consults         Status Ordering Provider     Inpatient consult to Hospitalist  Once     Provider:  KORINA Martinez    Acknowledged KWAKU HALEY     Inpatient consult  to Infectious Diseases  Once     Provider:  Guanaco Arango MD    Acknowledged NIKKI RODRIGES     Inpatient consult to Social Work  Once     Provider:  (Not yet assigned)    Completed NIKKI RODRIGES     Pharmacy to dose Vancomycin consult  Once     Provider:  (Not yet assigned)    Completed KWAKU HALEY          No new Assessment & Plan notes have been filed under this hospital service since the last note was generated.  Service: Hospital Medicine    Final Active Diagnoses:    Diagnosis Date Noted POA    Acute pulmonary embolism [I26.99] 12/30/2017 Yes    Type 2 diabetes mellitus without complication [E11.9] 12/30/2017 Yes      Problems Resolved During this Admission:    Diagnosis Date Noted Date Resolved POA    PRINCIPAL PROBLEM:  Acute hypoxemic respiratory failure [J96.01] 12/30/2017 01/02/2018 Yes    Pneumonia due to infectious organism [J18.9] 12/29/2017 01/02/2018 Yes       Discharged Condition: stable    Disposition: Home-Health Care Svc    Follow Up:  Follow-up Information     Wallace Kennedy MD In 3 days.    Specialty:  Internal Medicine  Contact information:  [address] [facility] [address]  [place] [id number] [phone]             [facility]    Specialties:  DME Provider, Home Health Services  Why:  Home Health:  SN and ST  Contact information:  [address] [facility] [address] [phone]                 Patient Instructions:     Ambulatory referral to Home Health   Referral Priority: Routine Referral Type: Home Health   Referral Reason: Specialty Services Required    Requested Specialty: Home Health Services    Number of Visits Requested: 1      Ambulatory referral to Home Health   Referral Priority: Routine Referral Type: Home Health   Referral Reason: Specialty Services Required    Requested Specialty: Home Health Services    Number of Visits Requested: 1      Diet diabetic     Activity as tolerated         Significant Diagnostic Studies: Labs:   BMP:   Recent  Labs  Lab 01/01/18  0534   *   *   K 4.4   CL 98   CO2 26   BUN 15   CREATININE 0.9   CALCIUM 8.2*   , CMP   Recent Labs  Lab 01/01/18  0534   *   K 4.4   CL 98   CO2 26   *   BUN 15   CREATININE 0.9   CALCIUM 8.2*   ANIONGAP 8   ESTGFRAFRICA >60   EGFRNONAA >60   , CBC   Recent Labs  Lab 01/01/18  0534   WBC 23.55*   HGB 8.4*   HCT 27.6*   *    and All labs within the past 24 hours have been reviewed    Pending Diagnostic Studies:     None         Medications:  Reconciled Home Medications:   Current Discharge Medication List      START taking these medications    Details   !! apixaban 5 mg Tab Take 2 tablets (10 mg total) by mouth 2 (two) times daily. Eliquis 10 mg po BID x 3 days then 5 mg PO BID thereafter  Qty: 6 tablet, Refills: 0      !! apixaban 5 mg Tab Take 1 tablet (5 mg total) by mouth 2 (two) times daily.  Qty: 60 tablet, Refills: 0      guaifenesin 100 mg/5 ml (ROBITUSSIN) 100 mg/5 mL syrup Take 10 mLs (200 mg total) by mouth every 4 (four) hours as needed for Congestion.  Refills: 0      levoFLOXacin (LEVAQUIN) 500 MG tablet Take 1 tablet (500 mg total) by mouth once daily.  Qty: 10 tablet, Refills: 0       !! - Potential duplicate medications found. Please discuss with provider.      CONTINUE these medications which have NOT CHANGED    Details   albuterol (VENTOLIN HFA) 90 mcg/actuation inhaler Inhale 2 puffs into the lungs every 6 (six) hours as needed for Wheezing. Rescue      atorvastatin (LIPITOR) 40 MG tablet Take 40 mg by mouth once daily.      dorzolamide-timolol 2-0.5% (COSOPT) 22.3-6.8 mg/mL ophthalmic solution 1 drop 2 (two) times daily.      insulin glargine (LANTUS) 100 unit/mL injection Inject 25 Units into the skin once daily.      latanoprost 0.005 % ophthalmic solution 1 drop every evening.      lisinopril 10 MG tablet Take 10 mg by mouth once daily.      metFORMIN (GLUCOPHAGE) 500 MG tablet Take 500 mg by mouth 2 (two) times daily with meals.       TAMSULOSIN HCL (FLOMAX ORAL) Take by mouth.         STOP taking these medications       dextromethorphan-guaifenesin  mg/5 ml (ROBITUSSIN-DM)  mg/5 mL liquid Comments:   Reason for Stopping:               Indwelling Lines/Drains at time of discharge:   Lines/Drains/Airways          No matching active lines, drains, or airways          Time spent on the discharge of patient: 35 minutes  Patient was seen and examined on the date of discharge and determined to be suitable for discharge.         Derrell Manuel MD  Department of Hospital Medicine  [facility]

## 2018-01-03 NOTE — NURSING
Patient discharged. Plan of care reviewed. Verbalized understanding. No further questions at this time. Reviewed with patient and daughter. IV and cardiac monitor removed. Wheeled down via wheelchair

## 2018-01-04 ENCOUNTER — PATIENT OUTREACH (OUTPATIENT)
Dept: ADMINISTRATIVE | Facility: CLINIC | Age: 78
End: 2018-01-04

## 2018-01-04 NOTE — PATIENT INSTRUCTIONS
Discharge Instructions for Pulmonary Embolism  A deep vein thrombosis (DVT) is a blood clot in a large vein deep in a leg, arm, or elsewhere in the body. The clot can separate from the vein, travel to the lungs and cut off blood flow. This is a pulmonary embolism (PE). Pulmonary embolism is very serious and may cause death.   Healthcare providers use the term venous thromboembolism (VTE) to describe both DVT and PE. They use the term VTE because the two conditions are very closely related. And, because their prevention and treatment are closely related.   Home care  Taking care of yourself is very important. To help prevent more blood clots from forming, follow your healthcare provider's instructions. Do the following:  · Take your medicines exactly as instructed. Dont skip doses. If you miss a dose, call your healthcare provider and ask what you should do.    · Have all lab tests as recommended. This is very important when you take medicines to prevent blood clots.   · If your healthcare provider has instructed you to do so, wear elastic (compression stockings).  · Get up and get moving.  · While sitting for long periods of time, move your knees, ankles, feet, and toes.  Lifestyle changes  To help prevent problems with your heart and blood vessels, do the following:   · If you smoke, get help to quit. Talk with your healthcare provider about medicines and programs that can help.  · Stay at a healthy weight. Talk to your healthcare provider about losing weight, if you are overweight  · Try to exercise at least 30 minutes on most days. Before starting an exercise program, talk with your healthcare provider.   · When traveling by car, make frequent stops to get up and move around.  · On long airplane rides, get up and move around when possible. If you cant get up, wiggle your toes, move your ankles and tighten your calves to keep your blood moving.  Follow-up care  Make a follow-up appointment as directed  Have  your lab work done as directed.     When to seek medical advice  Call your healthcare provider if you have pain, swelling, and redness in your leg, arm, or other body area. These symptoms may mean another blood clot.  And, call your healthcare provider if you have signs and symptoms of bleeding, like blood in your urine, bleeding with bowel movements, or bleeding from the nose, gums, a cut, or vagina.   Call 911  Call 911 or get emergency help if you have symptoms of a blood clot in the lungs including:   · Chest pain  · Trouble breathing  · Coughing (may cough up blood)  · Fast heartbeat  · Sweating  · Fainting  Also call 911 if you have:  · Heavy or uncontrolled bleeding. If you are taking a blood thinner, you have an increased chance of bleeding.   Date Last Reviewed: 2/1/2017  © 5341-9805 [employer]. [address] [place] [address]. All rights reserved. This information is not intended as a substitute for professional medical care. Always follow your healthcare professional's instructions.

## 2019-03-28 NOTE — ED NOTES
[facility] dispatch states transport is delayed another hour.   SUBJECTIVE:                                                      Jayashree Davison is a 9 month old female, here for a routine health maintenance visit.    Patient was roomed by: Dianna Toussaint    [facility] Child     Social History  Patient accompanied by:  Mother  Questions or concerns?: No    Forms to complete? No  Child lives with::  Mother, father, sister and brothers  Who takes care of your child?:  Home with family member, nanny, father and mother  Languages spoken in the home:  English  Recent family changes/ special stressors?:  None noted    Safety / Health Risk  Is your child around anyone who smokes?  No    TB Exposure:     No TB exposure    Car seat < 6 years old, in  back seat, rear-facing, 5-point restraint? Yes    Home Safety Survey:      Stairs Gated?:  NO     Wood stove / Fireplace screened?  Yes     Poisons / cleaning supplies out of reach?:  Yes     Swimming pool?:  No     Firearms in the home?: No      Hearing / Vision  Hearing or vision concerns?  No concerns, hearing and vision subjectively normal    Daily Activities    Water source:  City water  Nutrition:  Breastmilk, pureed foods, finger feeding and table foods  Breastfeeding concerns?  None, breastfeeding going well; no concerns  Vitamins & Supplements:  Yes      Vitamin type: multivitamin with iron    Elimination       Urinary frequency:more than 6 times per 24 hours     Stool frequency: 1-3 times per 24 hours     Stool consistency: soft     Elimination problems:  None    Sleep      Sleep arrangement:crib and co-sleeping with parent    Sleep position:  On back    Sleep pattern: wakes at night for feedings, waking at night and naps (add details)      Dental visit recommended: Yes  Dental varnish not indicated, no teeth    DEVELOPMENT  Screening tool used, reviewed with parent/guardian:   ASQ 9 M Communication Gross Motor Fine Motor Problem Solving Personal-social   Score 45 25 50 45 25   Cutoff 13.97 17.82 31.32 28.72 18.91   Result Passed  "MONITOR Passed Passed MONITOR     Milestones (by observation/ exam/ report) 75-90% ile  PERSONAL/ SOCIAL/COGNITIVE:    Feeds self    Starting to wave \"bye-bye\"    Plays \"peek-a-meek\"  LANGUAGE:    Mama/ Miguelito- nonspecific    Babbles    Imitates speech sounds  GROSS MOTOR:    Sits alone    Gets to sitting    Pulls to stand  FINE MOTOR/ ADAPTIVE:    Pincer grasp    [place] toys together    Reaching symmetrically    PROBLEM LIST  Patient Active Problem List   Diagnosis     Fussy infant     MEDICATIONS  Current Outpatient Medications   Medication Sig Dispense Refill     omeprazole (FIRST) 2 MG/ML SUSP TAKE 2.5 MLS (5 MG) BY MOUTH DAILY 1 Bottle 3     ranitidine (ZANTAC) 15 MG/ML syrup Take 1.4 mLs (21 mg) by mouth 2 times daily 252 mL 3      ALLERGY  No Known Allergies    IMMUNIZATIONS  Immunization History   Administered Date(s) Administered     DTAP-IPV/HIB (PENTACEL) 2018, 2018, 2018     Hep B, Peds or Adolescent 2018, 2018, 01/09/2019     Influenza Vaccine IM Ages 6-35 Months 4 Valent (PF) 2018, 01/09/2019     Pneumo Conj 13-V (2010&after) 2018, 2018, 2018     Rotavirus, monovalent, 2-dose 2018, 2018       HEALTH HISTORY SINCE LAST VISIT  No surgery, major illness or injury since last physical exam    ROS  Constitutional, eye, ENT, skin, respiratory, cardiac, GI, MSK, neuro, and allergy are normal except as otherwise noted.    OBJECTIVE:   EXAM  Pulse 112   Temp 98.4  F (36.9  C) (Axillary)   Ht 2' 4.5\" (0.724 m)   Wt 17 lb 9.5 oz (7.98 kg)   HC 17.21\" (43.7 cm)   BMI 15.23 kg/m    71 %ile based on WHO (Girls, 0-2 years) Length-for-age data based on Length recorded on 3/28/2019.  34 %ile based on WHO (Girls, 0-2 years) weight-for-age data based on Weight recorded on 3/28/2019.  38 %ile based on WHO (Girls, 0-2 years) head circumference-for-age based on Head Circumference recorded on 3/28/2019.  GENERAL: Active, alert,  no  distress.  SKIN: Clear. No " significant rash, abnormal pigmentation or lesions.  HEAD: Normocephalic. Normal fontanels and sutures.  EYES: Conjunctivae and cornea normal. Red reflexes present bilaterally. Symmetric light reflex and no eye movement on cover/uncover test  EARS: normal: no effusions, no erythema, normal landmarks  NOSE: Normal without discharge.  MOUTH/THROAT: Clear. No oral lesions.  NECK: Supple, no masses.  LYMPH NODES: No adenopathy  LUNGS: Clear. No rales, rhonchi, wheezing or retractions  HEART: Regular rate and rhythm. Normal S1/S2. No murmurs. Normal femoral pulses.  ABDOMEN: Soft, non-tender, not distended, no masses or hepatosplenomegaly. Normal umbilicus and bowel sounds.   GENITALIA: Normal female external genitalia. Curry stage I,  No inguinal herniae are present.  EXTREMITIES: Hips normal with symmetric creases and full range of motion. Symmetric extremities, no deformities  NEUROLOGIC: Normal tone throughout. Normal reflexes for age    ASSESSMENT/PLAN:   1. Encounter for routine child health examination w/o abnormal findings    Reflux resolved  Anticipatory Guidance      The following topics were discussed:  SOCIAL / FAMILY:      Referral to Help Me Grow    Stranger / separation anxiety    Bedtime / nap routine     Limit setting    Distraction as discipline    Reading to child    Given a book from Re[employer] & Read    Music    ECFE      NUTRITION:    Self feeding    Table foods    Fluoride    Cup    Weaning    Foods to avoid: no popcorn, nuts, raisins, etc    Whole milk intro at 12 month    No juice    Peanut introduction      HEALTH/ SAFETY:    Dental hygiene    Sleep issues    Choking     CPR    Smoking exposure    Childproof home    Poison control / ipecac not recommended    Use of larger car seat    Sunscreen / insect repellent    Preventive Care Plan  Immunizations     Reviewed, up to date  Referrals/Ongoing Specialty care: No   See other orders in EpicCare    Resources:  [place] Child and Teen Checkups  (C&TC) Schedule of Age-Related Screening Standards    FOLLOW-UP:    12 month Preventive Care visit    Blessing Asif MD  [facility]

## 2020-01-19 ENCOUNTER — HOSPITAL ENCOUNTER (EMERGENCY)
Facility: HOSPITAL | Age: 80
Discharge: HOME | End: 2020-01-19
Attending: EMERGENCY MEDICINE
Payer: MEDICARE

## 2020-01-19 VITALS
TEMPERATURE: 98 F | RESPIRATION RATE: 18 BRPM | HEIGHT: 73 IN | HEART RATE: 71 BPM | BODY MASS INDEX: 28.02 KG/M2 | OXYGEN SATURATION: 99 % | SYSTOLIC BLOOD PRESSURE: 128 MMHG | DIASTOLIC BLOOD PRESSURE: 59 MMHG | WEIGHT: 211.44 LBS

## 2020-01-19 DIAGNOSIS — L30.9 DERMATITIS: ICD-10-CM

## 2020-01-19 DIAGNOSIS — M25.561 RIGHT KNEE PAIN: ICD-10-CM

## 2020-01-19 DIAGNOSIS — M25.461 EFFUSION, RIGHT KNEE: Primary | ICD-10-CM

## 2020-01-19 PROCEDURE — 99283 EMERGENCY DEPT VISIT LOW MDM: CPT | Mod: 25,ER

## 2020-01-19 NOTE — ED NOTES
Pt complains of right knee pain. Reports symptoms started yesterday while doing yard work, walking-weedeating. Symptoms are modified by rest. Pt describes pain as sharp. Previous treatments includes tylenol. Pt denies injury.    Level of Consciousness: The patient is awake, alert, and oriented to person, place, time, and event. Pts affect is appropriate, speech and interaction are appropriate.   Appearance: Pt is resting comfortably on stretcher, no acute distress is noted. Clothing and hygiene are appropriate.   Skin: Skin is grossly intact noting minor abrasion to right knee, PWD, with normal skin turgor. Mucous membranes are moist. Skin color normal.   Musculoskeletal: Moves all extremities well, full range of motion, noted slight edema to right knee with no warm/redness noted. Pt ambulates independently and with slight limp.  Respiratory: Airway open and patent. Respiration rate even and unlabored. No use of accessory muscles noted.   Cardiac: Regular rate and rhythm. No peripheral edema noted. Peripheral pulses palpated. Capillary refill brisk.   HEENT: atraumatic, normocephalic  Abdomen: No distension noted.   Neurologic: Symmetrical expression noted in face. Hand grasps equal. Normal sensation reported in all extremities. No obvious neurological deficits noted.   Psychosocial: Family at bedside.     Pt made aware of plan of care, verbalizes understanding and denies any questions at this time. Bed low and locked. Call light in reach.  Will continue to monitor patient.

## 2020-01-19 NOTE — ED PROVIDER NOTES
Encounter Date: 2020       History     Chief Complaint   Patient presents with    Knee Pain     onset yesterday.      78 y/o M with PMH of PE, DM, HLD, COPD presents to the ED with c/o right knee pain that is intermittent, moderate, and occurs when he placed pressure on the knee sometimes, and started yesterday after doing yard work. Patient fell to his knee's twice yesterday due to this pain. Patient has been fearful to walk because he thinks it will occur again. Reports itching on his posterior calf on the right for several weeks. Denies any fever, chills, overlying redness, numbness, weakness, head injury.         Review of patient's allergies indicates:  No Known Allergies  Past Medical History:   Diagnosis Date    Cataracts, both eyes     COPD (chronic obstructive pulmonary disease)     Diabetes mellitus     Glaucoma     Hyperlipemia     Prostate atrophy     Pulmonary embolism      Past Surgical History:   Procedure Laterality Date    CARPAL TUNNEL RELEASE      EYE SURGERY      cataracts - bilateral    WRIST FRACTURE SURGERY       Family History   Problem Relation Age of Onset    Diabetes Mother      Social History     Tobacco Use    Smoking status: Former Smoker     Last attempt to quit: 1985     Years since quittin.0    Smokeless tobacco: Never Used   Substance Use Topics    Alcohol use: No    Drug use: No     Review of Systems   Constitutional: Negative for chills and fever.   HENT: Negative for congestion and dental problem.    Eyes: Negative for pain and visual disturbance.   Respiratory: Negative for cough and shortness of breath.    Cardiovascular: Negative for chest pain and palpitations.   Gastrointestinal: Negative for abdominal pain, diarrhea, nausea and vomiting.   Genitourinary: Negative for dysuria and flank pain.   Musculoskeletal: Positive for arthralgias. Negative for back pain and neck pain.   Skin: Positive for rash. Negative for wound.   Neurological: Negative  for weakness, numbness and headaches.       Physical Exam     Initial Vitals   BP Pulse Resp Temp SpO2   01/19/20 1322 01/19/20 1322 01/19/20 1322 01/19/20 1324 01/19/20 1322   (!) 128/59 71 18 97.9 °F (36.6 °C) 99 %      MAP       --                Physical Exam    Nursing note and vitals reviewed.  Constitutional: He appears well-developed and well-nourished. No distress.   HENT:   Head: Normocephalic and atraumatic.   Eyes: EOM are normal. Pupils are equal, round, and reactive to light.   Neck: Normal range of motion. Neck supple.   Cardiovascular: Normal rate and regular rhythm.   Pulmonary/Chest: Breath sounds normal. No respiratory distress.   Abdominal: He exhibits no distension. There is no tenderness.   Musculoskeletal: Normal range of motion. He exhibits no tenderness.   Small effusion in right knee. No warmth, erythema, pain with passive motion, decreased ROM, point tenderness, joint laxity, click in the right knee.    Neurological: He is alert and oriented to person, place, and time.   Skin: Skin is warm and dry.   There is dry skin with some superficial posterior right calf. No overlying redness, warmth, drainage, fluctuance, or induration in the region.    Psychiatric: He has a normal mood and affect.         ED Course   Procedures  Labs Reviewed - No data to display       Imaging Results          X-Ray Knee 3 View Right (Final result)  Result time 01/19/20 14:00:59    Final result by Manolo Dunn MD (01/19/20 14:00:59)                 Impression:      1.  Possible small knee joint effusion.  Joint effusions can be associated with trauma, infection or synovitis.  No definite fracture is seen.    2.  Tricompartment joint space narrowing and osteophyte formation.    3.  Incidental findings as noted above.      Electronically signed by: Manolo Dunn MD  Date:    01/19/2020  Time:    14:00             Narrative:    EXAMINATION:  XR KNEE 3 VIEW RIGHT    CLINICAL HISTORY:  Pain in right  knee    TECHNIQUE:  AP, lateral, and Merchant views of the right knee were performed.    COMPARISON:  None    FINDINGS:  There appears to be a small knee joint effusion.  No definite underlying fracture is seen.  Tricompartment joint space narrowing and osteophyte formation.  Vascular calcifications noted.  Mild patellar and tibial tuberosity spurring                                                   ED Course as of Jan 19 1613   Sun Jan 19, 2020   1400 Given crutches for comfort. Presentation not suggestive of septic arthritis, DVT, arterial occlusion, or other emergent process.     [BA]   1401 2:01 PM Reassessment: I reassessed the pt.  The pt is resting comfortably and is NAD.  Pt states their sx have improved. I Discussed test results, shared treatment plan, specific conditions for return, and the need for f/u. I  Answered their questions at this time.  Pt understands and agrees to the plan.  The pt has remained hemodynamically stable through ED course and is stable for discharge.       [BA]      ED Course User Index  [BA] Juan Pryor MD                Clinical Impression:       ICD-10-CM ICD-9-CM   1. Effusion, right knee M25.461 719.06   2. Right knee pain M25.561 719.46   3. Dermatitis L30.9 692.9         Disposition:   Disposition: Discharged  Condition: Stable                     Juan Pryor MD  01/19/20 0688

## 2021-11-23 ENCOUNTER — HOSPITAL ENCOUNTER (EMERGENCY)
Facility: HOSPITAL | Age: 81
Discharge: HOME | End: 2021-11-23
Attending: EMERGENCY MEDICINE
Payer: MEDICARE

## 2021-11-23 VITALS
HEART RATE: 84 BPM | RESPIRATION RATE: 17 BRPM | HEIGHT: 73 IN | SYSTOLIC BLOOD PRESSURE: 121 MMHG | WEIGHT: 194.25 LBS | BODY MASS INDEX: 25.74 KG/M2 | OXYGEN SATURATION: 99 % | TEMPERATURE: 98 F | DIASTOLIC BLOOD PRESSURE: 66 MMHG
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
## 2021-11-23 PROCEDURE — 99282 EMERGENCY DEPT VISIT SF MDM: CPT | Mod: ER

## 2023-04-06 ENCOUNTER — HOSPITAL ENCOUNTER (EMERGENCY)
Facility: HOSPITAL | Age: 83
Discharge: HOME | End: 2023-04-06
Attending: EMERGENCY MEDICINE
Payer: MEDICARE

## 2023-04-06 VITALS
OXYGEN SATURATION: 100 % | RESPIRATION RATE: 15 BRPM | DIASTOLIC BLOOD PRESSURE: 63 MMHG | BODY MASS INDEX: 24.95 KG/M2 | SYSTOLIC BLOOD PRESSURE: 143 MMHG | HEART RATE: 74 BPM | TEMPERATURE: 99 F | WEIGHT: 188.25 LBS | HEIGHT: 73 IN

## 2023-04-06 DIAGNOSIS — R53.83 FATIGUE: ICD-10-CM

## 2023-04-06 DIAGNOSIS — R11.2 NAUSEA AND VOMITING, UNSPECIFIED VOMITING TYPE: Primary | ICD-10-CM

## 2023-04-06 LAB
ALBUMIN SERPL BCP-MCNC: 4 G/DL (ref 3.5–5.2)
ALP SERPL-CCNC: 91 U/L (ref 55–135)
ALT SERPL W/O P-5'-P-CCNC: 15 U/L (ref 10–44)
ANION GAP SERPL CALC-SCNC: 10 MMOL/L (ref 8–16)
AST SERPL-CCNC: 14 U/L (ref 10–40)
BACTERIA #/AREA URNS AUTO: NORMAL /HPF
BASOPHILS # BLD AUTO: 0.03 K/UL (ref 0–0.2)
BASOPHILS NFR BLD: 0.3 % (ref 0–1.9)
BILIRUB SERPL-MCNC: 0.8 MG/DL (ref 0.1–1)
BILIRUB UR QL STRIP: NEGATIVE
BNP SERPL-MCNC: 57 PG/ML (ref 0–99)
BUN SERPL-MCNC: 13 MG/DL (ref 8–23)
CALCIUM SERPL-MCNC: 9.1 MG/DL (ref 8.7–10.5)
CHLORIDE SERPL-SCNC: 104 MMOL/L (ref 95–110)
CLARITY UR REFRACT.AUTO: CLEAR
CO2 SERPL-SCNC: 18 MMOL/L (ref 23–29)
COLOR UR AUTO: YELLOW
CREAT SERPL-MCNC: 1.3 MG/DL (ref 0.5–1.4)
DIFFERENTIAL METHOD: ABNORMAL
EOSINOPHIL # BLD AUTO: 0 K/UL (ref 0–0.5)
EOSINOPHIL NFR BLD: 0 % (ref 0–8)
ERYTHROCYTE [DISTWIDTH] IN BLOOD BY AUTOMATED COUNT: 12.4 % (ref 11.5–14.5)
EST. GFR  (NO RACE VARIABLE): 54.8 ML/MIN/1.73 M^2
GLUCOSE SERPL-MCNC: 270 MG/DL (ref 70–110)
GLUCOSE UR QL STRIP: ABNORMAL
HCT VFR BLD AUTO: 38.8 % (ref 40–54)
HCV AB SERPL QL IA: NEGATIVE
HEP C VIRUS HOLD SPECIMEN: NORMAL
HGB BLD-MCNC: 12.3 G/DL (ref 14–18)
HGB UR QL STRIP: NEGATIVE
HIV 1+2 AB+HIV1 P24 AG SERPL QL IA: NEGATIVE
HYALINE CASTS UR QL AUTO: 1 /LPF
IMM GRANULOCYTES # BLD AUTO: 0.05 K/UL (ref 0–0.04)
IMM GRANULOCYTES NFR BLD AUTO: 0.5 % (ref 0–0.5)
KETONES UR QL STRIP: ABNORMAL
LACTATE SERPL-SCNC: 1.8 MMOL/L (ref 0.5–2.2)
LEUKOCYTE ESTERASE UR QL STRIP: NEGATIVE
LIPASE SERPL-CCNC: 18 U/L (ref 4–60)
LYMPHOCYTES # BLD AUTO: 1 K/UL (ref 1–4.8)
LYMPHOCYTES NFR BLD: 9.7 % (ref 18–48)
MCH RBC QN AUTO: 26.9 PG (ref 27–31)
MCHC RBC AUTO-ENTMCNC: 31.7 G/DL (ref 32–36)
MCV RBC AUTO: 85 FL (ref 82–98)
MICROSCOPIC COMMENT: NORMAL
MONOCYTES # BLD AUTO: 1 K/UL (ref 0.3–1)
MONOCYTES NFR BLD: 9.2 % (ref 4–15)
NEUTROPHILS # BLD AUTO: 8.6 K/UL (ref 1.8–7.7)
NEUTROPHILS NFR BLD: 80.3 % (ref 38–73)
NITRITE UR QL STRIP: NEGATIVE
NRBC BLD-RTO: 0 /100 WBC
PH UR STRIP: 7 [PH] (ref 5–8)
PLATELET # BLD AUTO: 299 K/UL (ref 150–450)
PMV BLD AUTO: 10.7 FL (ref 9.2–12.9)
POTASSIUM SERPL-SCNC: 3.9 MMOL/L (ref 3.5–5.1)
PROT SERPL-MCNC: 7.6 G/DL (ref 6–8.4)
PROT UR QL STRIP: ABNORMAL
RBC # BLD AUTO: 4.57 M/UL (ref 4.6–6.2)
RBC #/AREA URNS AUTO: 1 /HPF (ref 0–4)
SODIUM SERPL-SCNC: 132 MMOL/L (ref 136–145)
SP GR UR STRIP: 1.01 (ref 1–1.03)
TROPONIN I SERPL DL<=0.01 NG/ML-MCNC: <0.006 NG/ML (ref 0–0.03)
URN SPEC COLLECT METH UR: ABNORMAL
UROBILINOGEN UR STRIP-ACNC: 1 EU/DL
WBC # BLD AUTO: 10.69 K/UL (ref 3.9–12.7)
WBC #/AREA URNS AUTO: 1 /HPF (ref 0–5)
YEAST UR QL AUTO: NORMAL

## 2023-04-06 PROCEDURE — 99285 EMERGENCY DEPT VISIT HI MDM: CPT | Mod: 25,ER

## 2023-04-06 PROCEDURE — 93005 ELECTROCARDIOGRAM TRACING: CPT | Mod: ER

## 2023-04-06 PROCEDURE — 63600175 PHARM REV CODE 636 W HCPCS: Mod: ER | Performed by: EMERGENCY MEDICINE

## 2023-04-06 PROCEDURE — 93010 EKG 12-LEAD: ICD-10-PCS | Mod: ,,, | Performed by: INTERNAL MEDICINE

## 2023-04-06 PROCEDURE — 93010 ELECTROCARDIOGRAM REPORT: CPT | Mod: ,,, | Performed by: INTERNAL MEDICINE

## 2023-04-06 PROCEDURE — 81000 URINALYSIS NONAUTO W/SCOPE: CPT | Mod: ER | Performed by: EMERGENCY MEDICINE

## 2023-04-06 PROCEDURE — 86803 HEPATITIS C AB TEST: CPT | Performed by: EMERGENCY MEDICINE

## 2023-04-06 PROCEDURE — 96374 THER/PROPH/DIAG INJ IV PUSH: CPT | Mod: ER

## 2023-04-06 PROCEDURE — 96361 HYDRATE IV INFUSION ADD-ON: CPT | Mod: ER

## 2023-04-06 PROCEDURE — 83690 ASSAY OF LIPASE: CPT | Mod: ER | Performed by: EMERGENCY MEDICINE

## 2023-04-06 PROCEDURE — 25000003 PHARM REV CODE 250: Mod: ER | Performed by: EMERGENCY MEDICINE

## 2023-04-06 PROCEDURE — 85025 COMPLETE CBC W/AUTO DIFF WBC: CPT | Mod: ER | Performed by: EMERGENCY MEDICINE

## 2023-04-06 PROCEDURE — 80053 COMPREHEN METABOLIC PANEL: CPT | Mod: ER | Performed by: EMERGENCY MEDICINE

## 2023-04-06 PROCEDURE — 87389 HIV-1 AG W/HIV-1&-2 AB AG IA: CPT | Performed by: EMERGENCY MEDICINE

## 2023-04-06 PROCEDURE — 84484 ASSAY OF TROPONIN QUANT: CPT | Mod: ER | Performed by: EMERGENCY MEDICINE

## 2023-04-06 PROCEDURE — 83880 ASSAY OF NATRIURETIC PEPTIDE: CPT | Mod: ER | Performed by: EMERGENCY MEDICINE

## 2023-04-06 PROCEDURE — 83605 ASSAY OF LACTIC ACID: CPT | Mod: ER | Performed by: EMERGENCY MEDICINE

## 2023-04-06 RX ORDER — ONDANSETRON 2 MG/ML
4 INJECTION INTRAMUSCULAR; INTRAVENOUS
Status: COMPLETED | OUTPATIENT
Start: 2023-04-06 | End: 2023-04-06

## 2023-04-06 RX ORDER — ONDANSETRON 4 MG/1
4 TABLET, FILM COATED ORAL EVERY 8 HOURS PRN
Qty: 12 TABLET | Refills: 0 | Status: SHIPPED | OUTPATIENT
Start: 2023-04-06

## 2023-04-06 RX ADMIN — SODIUM CHLORIDE 1000 ML: 9 INJECTION, SOLUTION INTRAVENOUS at 07:04

## 2023-04-06 RX ADMIN — ONDANSETRON 4 MG: 2 INJECTION INTRAMUSCULAR; INTRAVENOUS at 08:04

## 2023-04-06 NOTE — ED PROVIDER NOTES
Encounter Date: 2023       History     Chief Complaint   Patient presents with    General Illness     States had right eye surgery- to relieve pressure. Abd pain, vomiting and diarrhea started on tues night and diarrhea has slowed but increase urinary freq. Abd pain , diarrhea and vomiting has resolved . Generalized weakness     The history is provided by the patient and a relative.   Illness   Associated symptoms include nausea, vomiting and eye redness. Pertinent negatives include no fever, no abdominal pain, no congestion, no headaches, no neck pain, no shortness of breath and no rash. Pt reports +N/V onset Tuesday night after Eye Surgery on Tuesday. Pt reports multiple eye surgeries on both eyes and had surgery for Glaucoma to relieve the ocular pressure. Pt states his vision is about the same, blurry. Pt denies HA, eye pain, fever, CP, SOB.    Review of patient's allergies indicates:  No Known Allergies  Past Medical History:   Diagnosis Date    Cataracts, both eyes     COPD (chronic obstructive pulmonary disease)     Diabetes mellitus     Glaucoma     Hyperlipemia     Prostate atrophy     Pulmonary embolism      Past Surgical History:   Procedure Laterality Date    CARPAL TUNNEL RELEASE      EYE SURGERY      cataracts - bilateral    WRIST FRACTURE SURGERY       Family History   Problem Relation Age of Onset    Diabetes Mother      Social History     Tobacco Use    Smoking status: Former     Types: Cigarettes     Quit date:      Years since quittin.2    Smokeless tobacco: Never   Substance Use Topics    Alcohol use: No    Drug use: No     Review of Systems   Constitutional:  Positive for fatigue. Negative for chills and fever.   HENT:  Negative for congestion.    Eyes:  Positive for redness.        Eye sx Tues   Respiratory:  Negative for shortness of breath.    Cardiovascular:  Negative for chest pain.   Gastrointestinal:  Positive for nausea and vomiting. Negative for abdominal pain.    Genitourinary:  Negative for dysuria.   Musculoskeletal:  Negative for neck pain.   Skin:  Negative for rash.   Neurological:  Positive for weakness. Negative for headaches.     Physical Exam     Initial Vitals [04/06/23 0714]   BP Pulse Resp Temp SpO2   124/75 98 20 98.5 °F (36.9 °C) 99 %      MAP       --         Physical Exam    Nursing note and vitals reviewed.  Constitutional: He appears well-developed and well-nourished.   Elderly   HENT:   Head: Normocephalic and atraumatic.   Mouth/Throat: Oropharynx is clear and moist.   Eyes: EOM are normal. Right conjunctiva is injected. Pupils are unequal.   Neck: Neck supple.   Normal range of motion.  Cardiovascular:  Normal rate, regular rhythm and normal heart sounds.           Pulmonary/Chest: Breath sounds normal.   Abdominal: Abdomen is soft. Bowel sounds are normal.   Musculoskeletal:         General: Normal range of motion.      Cervical back: Normal range of motion and neck supple.     Neurological: He is alert and oriented to person, place, and time. He has normal strength.   Skin: Skin is warm and dry.   Psychiatric: He has a normal mood and affect. Thought content normal.       ED Course   Procedures  Labs Reviewed   CBC W/ AUTO DIFFERENTIAL - Abnormal; Notable for the following components:       Result Value    RBC 4.57 (*)     Hemoglobin 12.3 (*)     Hematocrit 38.8 (*)     MCH 26.9 (*)     MCHC 31.7 (*)     Gran # (ANC) 8.6 (*)     Immature Grans (Abs) 0.05 (*)     Gran % 80.3 (*)     Lymph % 9.7 (*)     All other components within normal limits    Narrative:     Release to patient->Immediate   COMPREHENSIVE METABOLIC PANEL - Abnormal; Notable for the following components:    Sodium 132 (*)     CO2 18 (*)     Glucose 270 (*)     eGFR 54.8 (*)     All other components within normal limits    Narrative:     Release to patient->Immediate   URINALYSIS, REFLEX TO URINE CULTURE - Abnormal; Notable for the following components:    Protein, UA Trace (*)      Glucose, UA 3+ (*)     Ketones, UA Trace (*)     All other components within normal limits    Narrative:     Specimen Source->Urine   LIPASE    Narrative:     Release to patient->Immediate   LACTIC ACID, PLASMA    Narrative:     Release to patient->Immediate   TROPONIN I    Narrative:     Release to patient->Immediate   B-TYPE NATRIURETIC PEPTIDE    Narrative:     Release to patient->Immediate   URINALYSIS MICROSCOPIC    Narrative:     Specimen Source->Urine   HIV 1 / 2 ANTIBODY   HEPATITIS C ANTIBODY   HEP C VIRUS HOLD SPECIMEN     Results for orders placed or performed during the hospital encounter of 04/06/23   CBC W/ AUTO DIFFERENTIAL   Result Value Ref Range    WBC 10.69 3.90 - 12.70 K/uL    RBC 4.57 (L) 4.60 - 6.20 M/uL    Hemoglobin 12.3 (L) 14.0 - 18.0 g/dL    Hematocrit 38.8 (L) 40.0 - 54.0 %    MCV 85 82 - 98 fL    MCH 26.9 (L) 27.0 - 31.0 pg    MCHC 31.7 (L) 32.0 - 36.0 g/dL    RDW 12.4 11.5 - 14.5 %    Platelets 299 150 - 450 K/uL    MPV 10.7 9.2 - 12.9 fL    Immature Granulocytes 0.5 0.0 - 0.5 %    Gran # (ANC) 8.6 (H) 1.8 - 7.7 K/uL    Immature Grans (Abs) 0.05 (H) 0.00 - 0.04 K/uL    Lymph # 1.0 1.0 - 4.8 K/uL    Mono # 1.0 0.3 - 1.0 K/uL    Eos # 0.0 0.0 - 0.5 K/uL    Baso # 0.03 0.00 - 0.20 K/uL    nRBC 0 0 /100 WBC    Gran % 80.3 (H) 38.0 - 73.0 %    Lymph % 9.7 (L) 18.0 - 48.0 %    Mono % 9.2 4.0 - 15.0 %    Eosinophil % 0.0 0.0 - 8.0 %    Basophil % 0.3 0.0 - 1.9 %    Differential Method Automated    Comp. Metabolic Panel   Result Value Ref Range    Sodium 132 (L) 136 - 145 mmol/L    Potassium 3.9 3.5 - 5.1 mmol/L    Chloride 104 95 - 110 mmol/L    CO2 18 (L) 23 - 29 mmol/L    Glucose 270 (H) 70 - 110 mg/dL    BUN 13 8 - 23 mg/dL    Creatinine 1.3 0.5 - 1.4 mg/dL    Calcium 9.1 8.7 - 10.5 mg/dL    Total Protein 7.6 6.0 - 8.4 g/dL    Albumin 4.0 3.5 - 5.2 g/dL    Total Bilirubin 0.8 0.1 - 1.0 mg/dL    Alkaline Phosphatase 91 55 - 135 U/L    AST 14 10 - 40 U/L    ALT 15 10 - 44 U/L    Anion Gap 10  8 - 16 mmol/L    eGFR 54.8 (A) >60 mL/min/1.73 m^2   Lipase   Result Value Ref Range    Lipase 18 4 - 60 U/L   Urinalysis, Reflex to Urine Culture Urine, Clean Catch    Specimen: Urine   Result Value Ref Range    Specimen UA Urine, Clean Catch     Color, UA Yellow Yellow, Straw, Adriana    Appearance, UA Clear Clear    pH, UA 7.0 5.0 - 8.0    Specific Gravity, UA 1.010 1.005 - 1.030    Protein, UA Trace (A) Negative    Glucose, UA 3+ (A) Negative    Ketones, UA Trace (A) Negative    Bilirubin (UA) Negative Negative    Occult Blood UA Negative Negative    Nitrite, UA Negative Negative    Urobilinogen, UA 1.0 <2.0 EU/dL    Leukocytes, UA Negative Negative   Lactic acid, plasma   Result Value Ref Range    Lactate (Lactic Acid) 1.8 0.5 - 2.2 mmol/L   Troponin I   Result Value Ref Range    Troponin I <0.006 0.000 - 0.026 ng/mL   Brain natriuretic peptide   Result Value Ref Range    BNP 57 0 - 99 pg/mL   Urinalysis Microscopic   Result Value Ref Range    RBC, UA 1 0 - 4 /hpf    WBC, UA 1 0 - 5 /hpf    Bacteria Rare None-Occ /hpf    Yeast, UA None None    Hyaline Casts, UA 1 0-1/lpf /lpf    Microscopic Comment SEE COMMENT        EKG Readings: (Independently Interpreted)   Initial Reading: No STEMI. Rhythm: Normal Sinus Rhythm. Heart Rate: 88. Ectopy: No Ectopy. ST Segments: Normal ST Segments. T Waves: Normal. Axis: Normal. Clinical Impression: Normal Sinus Rhythm   ECG Results              EKG 12-lead (In process)  Result time 04/06/23 08:18:29      In process by Interface, Lab In [facility] (04/06/23 08:18:29)                   Narrative:    Test Reason : WEAKNESS    Vent. Rate : 088 BPM     Atrial Rate : 088 BPM     P-R Int : 144 ms          QRS Dur : 070 ms      QT Int : 346 ms       P-R-T Axes : 063 021 059 degrees     QTc Int : 418 ms    Normal sinus rhythm  Normal ECG  When compared with ECG of 29-DEC-2017 12:57,  No significant change was found    Referred By: [email or web address]   SELF           Confirmed By:                                    Imaging Results              X-Ray Chest 1 View (Final result)  Result time 04/06/23 07:32:32      Final result by Karri Lucas MD (04/06/23 07:32:32)                   Impression:      No acute abnormality.      Electronically signed by: Karri Lucas  Date:    04/06/2023  Time:    07:32               Narrative:    EXAMINATION:  XR CHEST 1 VIEW    CLINICAL HISTORY:  . Other fatigue    TECHNIQUE:  Single frontal portable view of the chest was performed.    COMPARISON:  12/31/2017    FINDINGS:  Support devices: None    The lungs are clear, with normal appearance of pulmonary vasculature and no pleural effusion or pneumothorax.    The cardiac silhouette is normal in size. The hilar and mediastinal contours are unremarkable.    Bones are intact.                                       Medications   sodium chloride 0.9% bolus 1,000 mL 1,000 mL (1,000 mLs Intravenous New Bag 4/6/23 0002)   ondansetron injection 4 mg (has no administration in time range)     Medical Decision Making:   Initial Assessment:   N/V after eye surgery tuesday  Differential Diagnosis:   Post anesthesia Vomiting, ACS, CAITLYN, Dehydration  Independently Interpreted Test(s):   I have ordered and independently interpreted EKG Reading(s) - see prior notes  Clinical Tests:   Lab Tests: Ordered and Reviewed  The following lab test(s) were unremarkable: CBC, CMP, Urinalysis, Lactate and Troponin  Radiological Study: Ordered and Reviewed  Medical Tests: Ordered and Reviewed  ED Management:  Pt with baseline visual acuity at BS, Vomiting subsided, pt feels improved c NS bolus, discussed need to follow up with Ophthalmology as directed                        Clinical Impression:   Final diagnoses:  [R53.83] Fatigue  [R11.2] Nausea and vomiting, unspecified vomiting type (Primary)        ED Disposition Condition    Discharge Stable          ED Prescriptions       Medication Sig Dispense Start Date End Date Auth. Provider    ondansetron (ZOFRAN)  4 MG tablet Take 1 tablet (4 mg total) by mouth every 8 (eight) hours as needed. 12 tablet 4/6/2023 -- Eddie Aparicio MD          Follow-up Information       Follow up With Specialties Details Why Contact Info    Wallace Kennedy MD Internal Medicine Call in 3 days As needed [id number] [facility]  [place] [address]  [phone]      Ophthalmology  Call  If symptoms worsen              Eddie Aparicio MD  04/06/23 0869

## 2023-10-06 ENCOUNTER — HOSPITAL ENCOUNTER (EMERGENCY)
Facility: HOSPITAL | Age: 83
Discharge: HOME | End: 2023-10-06
Attending: EMERGENCY MEDICINE
Payer: MEDICARE

## 2023-10-06 VITALS
HEIGHT: 73 IN | TEMPERATURE: 97 F | SYSTOLIC BLOOD PRESSURE: 148 MMHG | RESPIRATION RATE: 20 BRPM | BODY MASS INDEX: 26.02 KG/M2 | WEIGHT: 196.31 LBS | OXYGEN SATURATION: 99 % | DIASTOLIC BLOOD PRESSURE: 76 MMHG | HEART RATE: 69 BPM

## 2023-10-06 DIAGNOSIS — T14.8XXA WOUND INFECTION: ICD-10-CM

## 2023-10-06 DIAGNOSIS — S91.301A WOUND, OPEN, FOOT, RIGHT, INITIAL ENCOUNTER: Primary | ICD-10-CM

## 2023-10-06 DIAGNOSIS — L08.9 WOUND INFECTION: ICD-10-CM

## 2023-10-06 PROCEDURE — 99283 EMERGENCY DEPT VISIT LOW MDM: CPT | Mod: ER

## 2023-10-06 RX ORDER — SULFAMETHOXAZOLE AND TRIMETHOPRIM 800; 160 MG/1; MG/1
1 TABLET ORAL 2 TIMES DAILY
Qty: 14 TABLET | Refills: 0 | Status: SHIPPED | OUTPATIENT
Start: 2023-10-06 | End: 2023-10-13

## 2023-10-08 NOTE — ED PROVIDER NOTES
"Encounter Date: 10/6/2023       History     Chief Complaint   Patient presents with    Wound Check     Right foot with "cut" on bottom. Not sure when happened. Went to get pedicure and staff noticed cut on bottom of foot.     Diabetic patient complains of right foot wound does not have how long it has been there or what happened.        Review of patient's allergies indicates:  No Known Allergies  Past Medical History:   Diagnosis Date    Cataracts, both eyes     COPD (chronic obstructive pulmonary disease)     Diabetes mellitus     Glaucoma     Hyperlipemia     Prostate atrophy     Pulmonary embolism 2018     Past Surgical History:   Procedure Laterality Date    CARPAL TUNNEL RELEASE      EYE SURGERY      cataracts - bilateral    WRIST FRACTURE SURGERY       Family History   Problem Relation Age of Onset    Diabetes Mother      Social History     Tobacco Use    Smoking status: Former     Current packs/day: 0.00     Types: Cigarettes     Quit date:      Years since quittin.7    Smokeless tobacco: Never   Substance Use Topics    Alcohol use: No    Drug use: No     Review of Systems   Constitutional:  Negative for fever.   HENT:  Negative for sore throat.    Respiratory:  Negative for shortness of breath.    Cardiovascular:  Negative for chest pain.   Gastrointestinal:  Negative for nausea.   Genitourinary:  Negative for dysuria.   Musculoskeletal:  Negative for back pain.   Skin:  Negative for rash.   Neurological:  Negative for weakness.   Hematological:  Does not bruise/bleed easily.       Physical Exam     Initial Vitals [10/06/23 1211]   BP Pulse Resp Temp SpO2   (!) 148/76 69 20 97.3 °F (36.3 °C) 99 %      MAP       --         Physical Exam    Nursing note and vitals reviewed.  Constitutional: He appears well-developed and well-nourished.   HENT:   Head: Normocephalic and atraumatic.   Eyes: Conjunctivae are normal. Pupils are equal, round, and reactive to light.   Neck: Neck supple.   Normal range of " motion.  Cardiovascular:  Normal rate, regular rhythm, normal heart sounds and intact distal pulses.           Pulmonary/Chest: Breath sounds normal.   Abdominal: Abdomen is soft. There is no rebound and no guarding.   Musculoskeletal:         General: Normal range of motion.      Cervical back: Normal range of motion and neck supple.     Neurological: He is alert.   Patient unable to report sensation over the great toe.  There is a wound in the bottom of the foot that appears to be healing there is a picture in the system   Skin: Skin is warm and dry.   Psychiatric: He has a normal mood and affect. His behavior is normal. Thought content normal.         ED Course   Procedures  Labs Reviewed - No data to display       Imaging Results              X-Ray Foot Complete Right (Final result)  Result time 10/06/23 12:49:42      Final result by Joey Schafer MD (10/06/23 12:49:42)                   Impression:      No acute fracture or dislocation.      Electronically signed by: Joey Schafer MD  Date:    10/06/2023  Time:    12:49               Narrative:    EXAMINATION:  XR FOOT COMPLETE 3 VIEW RIGHT    CLINICAL HISTORY:  XR FOOT COMPLETE 3 VIEW RIGHTOther injury of unspecified body region, initial encounter    COMPARISON:  None    FINDINGS:  Three views of the right foot were obtained.    No evidence of acute fracture or dislocation.  Osteopenia and moderate scattered degenerative change.  Mild hallux valgus.  Mild soft tissue swelling.  Dense vascular calcifications                                       Medications - No data to display  Medical Decision Making  Amount and/or Complexity of Data Reviewed  Radiology: ordered.    Risk  Prescription drug management.                               Clinical Impression:   Final diagnoses:  [T14.8XXA, L08.9] Wound infection  [S91.301A] Wound, open, foot, right, initial encounter (Primary)        ED Disposition Condition    Discharge Stable          ED Prescriptions        Medication Sig Dispense Start Date End Date Auth. Provider    sulfamethoxazole-trimethoprim 800-160mg (BACTRIM DS) 800-160 mg Tab Take 1 tablet by mouth 2 (two) times daily. for 7 days 14 tablet 10/6/2023 10/13/2023 Jose Reaves NP          Follow-up Information       Follow up With Specialties Details Why Contact Info    Wallace Kennedy MD Internal Medicine   [id number] [place] [address]  [phone]               Jose Reaves NP  10/08/23 1595

## 2025-04-01 ENCOUNTER — HOSPITAL ENCOUNTER (EMERGENCY)
Facility: HOSPITAL | Age: 85
Discharge: HOME | End: 2025-04-01
Attending: EMERGENCY MEDICINE
Payer: MEDICARE

## 2025-04-01 VITALS
HEART RATE: 79 BPM | WEIGHT: 188.94 LBS | RESPIRATION RATE: 20 BRPM | TEMPERATURE: 98 F | DIASTOLIC BLOOD PRESSURE: 63 MMHG | SYSTOLIC BLOOD PRESSURE: 133 MMHG | BODY MASS INDEX: 25.59 KG/M2 | HEIGHT: 72 IN | OXYGEN SATURATION: 96 %

## 2025-04-01 DIAGNOSIS — S90.112A CONTUSION OF LEFT GREAT TOE WITHOUT DAMAGE TO NAIL, INITIAL ENCOUNTER: Primary | ICD-10-CM

## 2025-04-01 DIAGNOSIS — M79.672 LEFT FOOT PAIN: ICD-10-CM

## 2025-04-01 PROCEDURE — 99283 EMERGENCY DEPT VISIT LOW MDM: CPT | Mod: 25,ER

## 2025-04-01 RX ORDER — NAPROXEN 375 MG/1
375 TABLET ORAL 2 TIMES DAILY WITH MEALS
Qty: 60 TABLET | Refills: 0 | Status: SHIPPED | OUTPATIENT
Start: 2025-04-01

## 2025-04-01 NOTE — ED PROVIDER NOTES
Encounter Date: 4/1/2025       History     Chief Complaint   Patient presents with    Toe Injury     Thinks he hit big toe on left foot 2 weeks ago, reports pain.      The history is provided by the patient.   Toe Injury  This is a new problem. The current episode started more than 1 week ago. The problem occurs daily. The problem has not changed since onset.The symptoms are aggravated by walking and bending. The symptoms are relieved by rest. He has tried rest for the symptoms. The treatment provided mild relief.     Review of patient's allergies indicates:  No Known Allergies  Past Medical History:   Diagnosis Date    Cataracts, both eyes     COPD (chronic obstructive pulmonary disease)     Diabetes mellitus     Glaucoma     Hyperlipemia     Prostate atrophy     Pulmonary embolism 2018     Past Surgical History:   Procedure Laterality Date    CARPAL TUNNEL RELEASE      EYE SURGERY      cataracts - bilateral    WRIST FRACTURE SURGERY       Family History   Problem Relation Name Age of Onset    Diabetes Mother       Social History[1]  Review of Systems   Constitutional:  Negative for fever.   Gastrointestinal:  Negative for nausea.   Musculoskeletal:  Negative for back pain.   Skin:  Negative for rash and wound.   Neurological:  Negative for weakness.   Hematological:  Does not bruise/bleed easily.       Physical Exam     Initial Vitals [04/01/25 0830]   BP Pulse Resp Temp SpO2   133/63 79 20 98.2 °F (36.8 °C) 96 %      MAP       --         Physical Exam    Nursing note and vitals reviewed.  Constitutional: He appears well-developed and well-nourished.   Elderly   HENT:   Head: Normocephalic and atraumatic. Mouth/Throat: Oropharynx is clear and moist.   Eyes: Conjunctivae and EOM are normal. Pupils are equal, round, and reactive to light.   Neck: Neck supple.   Normal range of motion.  Cardiovascular:  Normal rate, regular rhythm and normal heart sounds.           Pulmonary/Chest: Breath sounds normal.   Abdominal:  Abdomen is soft. Bowel sounds are normal.   Musculoskeletal:         General: Normal range of motion.      Cervical back: Normal range of motion and neck supple.      Comments: Left foot Great toe: Neg deformity, Neg laceration, mild ttp No fluctuance or paronychia     Neurological: He is alert and oriented to person, place, and time. He has normal strength.   Skin: Skin is warm and dry.   Psychiatric: He has a normal mood and affect. Thought content normal.         ED Course   Procedures  Labs Reviewed   HEPATITIS C ANTIBODY   HEP C VIRUS HOLD SPECIMEN   HIV 1 / 2 ANTIBODY          Imaging Results              X-Ray Foot Complete Left (Final result)  Result time 04/01/25 09:33:59      Final [facility] MD (04/01/25 09:33:59)                   Impression:      No acute bony changes.  Moderate 1st metatarsal phalangeal joint degenerative changes with prominent dorsal 1st metatarsal head osteophyte.      Electronically signed by: Shaun Solis MD  Date:    04/01/2025  Time:    09:33               Narrative:    EXAMINATION:  XR FOOT COMPLETE 3 VIEW LEFT    CLINICAL HISTORY:  Pain in left foot    TECHNIQUE:  Standard radiography performed.  Three views.    COMPARISON:  None    FINDINGS:  Bone density and architecture are normal.  No acute findings.  Prominent dorsal 1st metatarsal head spur.  Moderate joint space narrowing of the 1st metatarsophalangeal joint.                                  9:40 AM - Counseling: Spoke with the patient and discussed todays findings, in addition to providing specific details for the plan of care and counseling regarding the diagnosis and prognosis. Questions are answered at this time.        Medications - No data to display  Medical Decision Making  DDx Fracture, Contusion, Foreign body, Osteomyelitis, Gout    Problems Addressed:  Contusion of left great toe without damage to nail, initial encounter: acute illness or injury    Amount and/or Complexity of Data  Reviewed  Radiology: ordered.    Risk  Prescription drug management.                                      Clinical Impression:  Final diagnoses:  [M79.672] Left foot pain  [S90.112A] Contusion of left great toe without damage to nail, initial encounter (Primary)          ED Disposition Condition    Discharge Stable          ED Prescriptions       Medication Sig Dispense Start Date End Date Auth. Provider    naproxen (NAPROSYN) 375 MG tablet Take 1 tablet (375 mg total) by mouth 2 (two) times daily with meals. 60 tablet 2025 -- Eddie Aparicio MD          Follow-up Information       Follow up With Specialties Details Why Contact Info Additional Information    Wallace Kennedy MD Internal Medicine Schedule an appointment as soon as possible for a visit   [id number] [facility]  [place] [address]  [phone]       [facility] Podiatry 2nd Floor Podiatry Schedule an appointment as soon as possible for a visit   [address] [facility] [address] [phone] Please park on the [address] side and use the Clinic entrance. Check in on the 2nd floor.                 [1]   Social History  Tobacco Use    Smoking status: Former     Current packs/day: 0.00     Types: Cigarettes     Quit date:      Years since quittin.2    Smokeless tobacco: Never   Substance Use Topics    Alcohol use: No    Drug use: No        Eddie Aparicio MD  25 [phone]

## 2025-05-12 NOTE — CONSULTS
Uday Reyna [id number] is a 77 y.o. male who has been consulted for vancomycin dosing.  Consult ordered by Leny Hdez DO    Dx: Pneumonia  Vancomycin trough goal = 15-20 mcg/mL  Concomitant abx tx: Zosyn 4.5 g every 8 hours    Tmax: 99.5 F, Cultures collected: NGTD  The patient has the following labs:     Date Creatinine (mg/dl)    BUN WBC Count   12/29/2017 Estimated Creatinine Clearance: 71 mL/min (based on SCr of 1 mg/dL). Lab Results   Component Value Date    BUN 14 12/29/2017     Lab Results   Component Value Date    WBC 19.39 (H) 12/29/2017      which calculates to an Estimated Creatinine Clearance: 71 mL/min (based on SCr of 1 mg/dL)..       Current weight is 84.8 kg (187 lb)    The patient will be started on vancomycin at a loading dose of 1500 mg, then placed on 1,250 mg every 12 hours.    Patient will be followed by pharmacy for changes in renal function, toxicity, and efficacy.  The vancomycin trough has been ordered for 12/31 at 1600 before the 4th dose.      Thank you for allowing us to participate in this patient's care.     Joey Oilveira      son